# Patient Record
Sex: MALE | Race: WHITE | NOT HISPANIC OR LATINO
[De-identification: names, ages, dates, MRNs, and addresses within clinical notes are randomized per-mention and may not be internally consistent; named-entity substitution may affect disease eponyms.]

---

## 2020-01-27 DIAGNOSIS — Z86.79 PERSONAL HISTORY OF OTHER DISEASES OF THE CIRCULATORY SYSTEM: ICD-10-CM

## 2020-01-27 DIAGNOSIS — Z83.3 FAMILY HISTORY OF DIABETES MELLITUS: ICD-10-CM

## 2020-01-27 DIAGNOSIS — Z86.39 PERSONAL HISTORY OF OTHER ENDOCRINE, NUTRITIONAL AND METABOLIC DISEASE: ICD-10-CM

## 2020-01-27 DIAGNOSIS — Z80.41 FAMILY HISTORY OF MALIGNANT NEOPLASM OF OVARY: ICD-10-CM

## 2020-01-27 DIAGNOSIS — R73.03 PREDIABETES.: ICD-10-CM

## 2020-01-27 DIAGNOSIS — R42 DIZZINESS AND GIDDINESS: ICD-10-CM

## 2020-01-28 ENCOUNTER — NON-APPOINTMENT (OUTPATIENT)
Age: 45
End: 2020-01-28

## 2020-01-28 ENCOUNTER — APPOINTMENT (OUTPATIENT)
Dept: HEART AND VASCULAR | Facility: CLINIC | Age: 45
End: 2020-01-28
Payer: SELF-PAY

## 2020-01-28 VITALS
HEART RATE: 108 BPM | HEIGHT: 68 IN | RESPIRATION RATE: 16 BRPM | DIASTOLIC BLOOD PRESSURE: 98 MMHG | BODY MASS INDEX: 41.68 KG/M2 | SYSTOLIC BLOOD PRESSURE: 166 MMHG | WEIGHT: 275 LBS

## 2020-01-28 DIAGNOSIS — Z78.9 OTHER SPECIFIED HEALTH STATUS: ICD-10-CM

## 2020-01-28 PROCEDURE — 93000 ELECTROCARDIOGRAM COMPLETE: CPT

## 2020-01-28 PROCEDURE — 99204 OFFICE O/P NEW MOD 45 MIN: CPT

## 2020-01-28 RX ORDER — DILTIAZEM HYDROCHLORIDE 180 MG/1
180 CAPSULE, EXTENDED RELEASE ORAL
Refills: 0 | Status: DISCONTINUED | COMMUNITY
End: 2020-01-28

## 2020-01-28 RX ORDER — VITAMIN E ACID SUCCINATE 268 MG
TABLET ORAL
Refills: 0 | Status: ACTIVE | COMMUNITY

## 2020-01-28 RX ORDER — MULTIVITAMIN
TABLET ORAL
Refills: 0 | Status: ACTIVE | COMMUNITY

## 2020-01-28 RX ORDER — ASCORBIC ACID 500 MG
TABLET ORAL
Refills: 0 | Status: ACTIVE | COMMUNITY

## 2020-01-28 RX ORDER — ASPIRIN ENTERIC COATED TABLETS 81 MG 81 MG/1
81 TABLET, DELAYED RELEASE ORAL
Refills: 0 | Status: ACTIVE | COMMUNITY

## 2020-01-28 RX ORDER — ADHESIVE TAPE 3"X 2.3 YD
50 MCG TAPE, NON-MEDICATED TOPICAL
Refills: 0 | Status: ACTIVE | COMMUNITY

## 2020-01-28 NOTE — REASON FOR VISIT
[Initial Evaluation] : an initial evaluation of [FreeTextEntry1] : 44 year old M with history of WPW, ablation history in 2000, history of pericarditis in 2016, HTN, HL here with complaints of lightheadedness for 2 weeks, intermittent and on exertion. Fatigue on exertion. No chest pain, dyspnea, orthopnea, PND. Has been having LE edema over the past 2 months. Drinking water. \par \par EKG Sinus tachy with incomplete RBBB. \par \par Labs 01/2020: CMP WNL; A1C 7.0; Total chol 260; HDL 26; ; LDL (cant calculate).

## 2020-01-28 NOTE — DISCUSSION/SUMMARY
[FreeTextEntry1] : 44 year old M with history of WPW, ablation history in 2000, HTN, HL here with complaints of lightheadedness for 2 weeks, intermittent and on exertion. \par - d/c Diltiazem. Start Lisinopril 10 mg PO daily\par - Check stress echo\par - Check LE Doppler Venous\par - Holter 24 hours\par - EPS consult to be considered after testing\par - Followup after testing

## 2020-02-13 ENCOUNTER — APPOINTMENT (OUTPATIENT)
Dept: HEART AND VASCULAR | Facility: CLINIC | Age: 45
End: 2020-02-13
Payer: SELF-PAY

## 2020-02-13 VITALS
WEIGHT: 275 LBS | HEIGHT: 68 IN | HEART RATE: 100 BPM | BODY MASS INDEX: 41.68 KG/M2 | SYSTOLIC BLOOD PRESSURE: 124 MMHG | DIASTOLIC BLOOD PRESSURE: 70 MMHG

## 2020-02-13 PROCEDURE — 93325 DOPPLER ECHO COLOR FLOW MAPG: CPT

## 2020-02-13 PROCEDURE — 93351 STRESS TTE COMPLETE: CPT

## 2020-02-13 PROCEDURE — 93970 EXTREMITY STUDY: CPT

## 2020-02-13 PROCEDURE — 93320 DOPPLER ECHO COMPLETE: CPT

## 2020-02-13 RX ORDER — LISINOPRIL 10 MG/1
10 TABLET ORAL DAILY
Qty: 90 | Refills: 3 | Status: DISCONTINUED | COMMUNITY
Start: 2020-01-28 | End: 2020-02-13

## 2020-03-03 ENCOUNTER — APPOINTMENT (OUTPATIENT)
Dept: HEART AND VASCULAR | Facility: CLINIC | Age: 45
End: 2020-03-03
Payer: SELF-PAY

## 2020-03-03 VITALS
SYSTOLIC BLOOD PRESSURE: 100 MMHG | HEART RATE: 92 BPM | RESPIRATION RATE: 16 BRPM | HEIGHT: 68 IN | WEIGHT: 277 LBS | BODY MASS INDEX: 41.98 KG/M2 | DIASTOLIC BLOOD PRESSURE: 80 MMHG

## 2020-03-03 PROCEDURE — 99213 OFFICE O/P EST LOW 20 MIN: CPT

## 2020-03-03 NOTE — DISCUSSION/SUMMARY
[FreeTextEntry1] : 44 year old M with history of WPW, s/p ablation history in 2000, HTN, HL, Obesity. + fatigue. CVS stable. \par -  BP well controlled - c/w Lisinopril 20 mg PO daily\par -  Recommend sleep study. \par -  Diet/Exercise/Weight loss counseling\par -  Will return in 4 months

## 2020-03-03 NOTE — PHYSICAL EXAM
[Normal Appearance] : normal appearance [General Appearance - Well Developed] : well developed [Well Groomed] : well groomed [General Appearance - Well Nourished] : well nourished [No Deformities] : no deformities [General Appearance - In No Acute Distress] : no acute distress [No Oral Pallor] : no oral pallor [Normal Oral Mucosa] : normal oral mucosa [No Oral Cyanosis] : no oral cyanosis [Respiration, Rhythm And Depth] : normal respiratory rhythm and effort [Auscultation Breath Sounds / Voice Sounds] : lungs were clear to auscultation bilaterally [Exaggerated Use Of Accessory Muscles For Inspiration] : no accessory muscle use [Heart Rate And Rhythm] : heart rate and rhythm were normal [Murmurs] : no murmurs present [Heart Sounds] : normal S1 and S2 [Abdomen Soft] : soft [Abdomen Mass (___ Cm)] : no abdominal mass palpated [Abdomen Tenderness] : non-tender [Abnormal Walk] : normal gait [Gait - Sufficient For Exercise Testing] : the gait was sufficient for exercise testing [Nail Clubbing] : no clubbing of the fingernails [] : no ischemic changes [Petechial Hemorrhages (___cm)] : no petechial hemorrhages [Cyanosis, Localized] : no localized cyanosis [Oriented To Time, Place, And Person] : oriented to person, place, and time [No Anxiety] : not feeling anxious [Affect] : the affect was normal [Mood] : the mood was normal [FreeTextEntry1] : Edema - 2+ pitting

## 2020-03-03 NOTE — REASON FOR VISIT
[Initial Evaluation] : an initial evaluation of [FreeTextEntry1] : 44 year old M with history of WPW, ablation history in 2000, history of pericarditis in 2016, HTN, HL here for followup after recent testing. Still with fatigue on exertion. No chest pain, dyspnea, orthopnea, PND. Still has some LE edema. Drinking water. \par \par EXSE 02/13/2020: [facility] protocol 6 min - no EKG changes. No WMA. Accelerated HR. \par Resting echo: Normal LV function without significant valvular abnormalities. \par \par Holter 24 hour - SR with rare PVCs\par \par U/S LE Doppler - No DVT. no reflux\par \par EKG Sinus tachy with incomplete RBBB. \par \par Labs 01/2020: CMP WNL; A1C 7.0; Total chol 260; HDL 26; ; LDL (cant calculate).

## 2020-07-14 ENCOUNTER — APPOINTMENT (OUTPATIENT)
Dept: HEART AND VASCULAR | Facility: CLINIC | Age: 45
End: 2020-07-14
Payer: SELF-PAY

## 2020-07-14 VITALS
RESPIRATION RATE: 16 BRPM | HEART RATE: 120 BPM | BODY MASS INDEX: 40.62 KG/M2 | WEIGHT: 268 LBS | DIASTOLIC BLOOD PRESSURE: 80 MMHG | OXYGEN SATURATION: 94 % | TEMPERATURE: 98.7 F | SYSTOLIC BLOOD PRESSURE: 115 MMHG | HEIGHT: 68 IN

## 2020-07-14 PROCEDURE — 99214 OFFICE O/P EST MOD 30 MIN: CPT

## 2020-07-14 RX ORDER — LISINOPRIL 20 MG/1
20 TABLET ORAL DAILY
Qty: 90 | Refills: 3 | Status: DISCONTINUED | COMMUNITY
End: 2020-07-14

## 2020-07-14 NOTE — PHYSICAL EXAM
[General Appearance - Well Developed] : well developed [Well Groomed] : well groomed [Normal Appearance] : normal appearance [No Deformities] : no deformities [General Appearance - Well Nourished] : well nourished [General Appearance - In No Acute Distress] : no acute distress [Normal Oral Mucosa] : normal oral mucosa [No Oral Pallor] : no oral pallor [No Oral Cyanosis] : no oral cyanosis [Exaggerated Use Of Accessory Muscles For Inspiration] : no accessory muscle use [Auscultation Breath Sounds / Voice Sounds] : lungs were clear to auscultation bilaterally [Respiration, Rhythm And Depth] : normal respiratory rhythm and effort [Heart Rate And Rhythm] : heart rate and rhythm were normal [Heart Sounds] : normal S1 and S2 [Abdomen Soft] : soft [Murmurs] : no murmurs present [Abdomen Mass (___ Cm)] : no abdominal mass palpated [Abdomen Tenderness] : non-tender [Gait - Sufficient For Exercise Testing] : the gait was sufficient for exercise testing [Abnormal Walk] : normal gait [Nail Clubbing] : no clubbing of the fingernails [Petechial Hemorrhages (___cm)] : no petechial hemorrhages [Cyanosis, Localized] : no localized cyanosis [Oriented To Time, Place, And Person] : oriented to person, place, and time [] : no ischemic changes [Mood] : the mood was normal [Affect] : the affect was normal [No Anxiety] : not feeling anxious [FreeTextEntry1] : Edema - 2+ pitting

## 2020-07-14 NOTE — REASON FOR VISIT
[Initial Evaluation] : an initial evaluation of [FreeTextEntry1] : 45 year old M with history of WPW, ablation history in 2000, history of pericarditis in 2016, HTN, HL here for routine followup after recent testing. Still with fatigue on exertion. No chest pain, dyspnea, orthopnea, PND. LE improved. Now with markedly elevated TG as per PMD. Started on Fenofibrate.Stopped Lsinopril in March and still has had cough. Started Amlodipine 5 mg PO daily. States BP readings at home have been 140's SBP. Today in office BP WNL.\par \par EXSE 02/13/2020: [facility] protocol 6 min - no EKG changes. No WMA. Accelerated HR. \par Resting echo: Normal LV function without significant valvular abnormalities. \par \par Holter 24 hour - SR with rare PVCs\par \par U/S LE Doppler - No DVT. no reflux\par \par EKG Sinus tachy with incomplete RBBB. \par \par Labs 01/2020: CMP WNL; A1C 7.0; Total chol 260; HDL 26; ; LDL (cant calculate).

## 2020-07-14 NOTE — DISCUSSION/SUMMARY
[FreeTextEntry1] : 45 year old M with history of WPW, s/p ablation history in 2000, HTN, HL, Obesity. + fatigue. CVS stable. \par -  BP elevated - will ask patient to bring BP monitor to cross check - c/w Amlodipine 5 mg PO daily\par -  Recommend sleep study. \par - Recheck lipids in 608 weeks on new lipid mgmt regimen. \par -  Diet/Exercise/Weight loss counseling\par -  Will return in 6 months

## 2020-09-29 ENCOUNTER — APPOINTMENT (OUTPATIENT)
Dept: HEART AND VASCULAR | Facility: CLINIC | Age: 45
End: 2020-09-29
Payer: SELF-PAY

## 2020-09-29 VITALS
OXYGEN SATURATION: 95 % | BODY MASS INDEX: 40.06 KG/M2 | RESPIRATION RATE: 16 BRPM | DIASTOLIC BLOOD PRESSURE: 86 MMHG | SYSTOLIC BLOOD PRESSURE: 130 MMHG | TEMPERATURE: 97.8 F | HEART RATE: 104 BPM | WEIGHT: 264.31 LBS | HEIGHT: 68 IN

## 2020-09-29 PROCEDURE — 99214 OFFICE O/P EST MOD 30 MIN: CPT

## 2020-09-29 NOTE — REASON FOR VISIT
[Initial Evaluation] : an initial evaluation of [FreeTextEntry1] : 45 year old M with history of WPW, ablation history in 2000, history of pericarditis in 2016, HTN, HL here for routine followup  Still with fatigue on exertion and daytime somnolence. States he hasn’t done sleep study as yet due to cost. He does admit to being up all night though. No chest pain, dyspnea, orthopnea, PND. LE present today. Admits to drinking 12 glasses of 8 oz glass of water.  States BP readings at home have been 150's SBP. Today in office  mm Hg\par \par Note: Not on Lisinopril due to cough. \par \par EXSE 02/13/2020: [facility] protocol 6 min - no EKG changes. No WMA. Accelerated HR. \par Resting echo: Normal LV function without significant valvular abnormalities. \par \par Holter 24 hour - SR with rare PVCs\par \par U/S LE Doppler - No DVT. no reflux\par \par EKG Sinus tachy with incomplete RBBB. \par \par Labs 01/2020: CMP WNL; A1C 7.0; Total chol 260; HDL 26; ; LDL (cant calculate).

## 2020-09-29 NOTE — DISCUSSION/SUMMARY
[FreeTextEntry1] : 45 year old M with history of WPW, s/p ablation history in 2000, HTN, HL, Obesity. + fatigue. CVS stable. \par -  BP elevated - will ask patient to bring BP monitor to cross check - c/w Amlodipine 5 mg PO daily. To start Furosemide 20 mg PO daily x 5 days. \par -  Check CMP, lipid panel\par - c/w Fenofibrate. \par -  Diet/Exercise/Weight loss counseling\par -  Will return in 2 weeks.

## 2020-09-29 NOTE — PHYSICAL EXAM
[General Appearance - Well Developed] : well developed [Normal Appearance] : normal appearance [Well Groomed] : well groomed [General Appearance - Well Nourished] : well nourished [No Deformities] : no deformities [General Appearance - In No Acute Distress] : no acute distress [Normal Oral Mucosa] : normal oral mucosa [No Oral Pallor] : no oral pallor [No Oral Cyanosis] : no oral cyanosis [Respiration, Rhythm And Depth] : normal respiratory rhythm and effort [Exaggerated Use Of Accessory Muscles For Inspiration] : no accessory muscle use [Auscultation Breath Sounds / Voice Sounds] : lungs were clear to auscultation bilaterally [Heart Rate And Rhythm] : heart rate and rhythm were normal [Heart Sounds] : normal S1 and S2 [Murmurs] : no murmurs present [Abdomen Soft] : soft [Abdomen Tenderness] : non-tender [Abdomen Mass (___ Cm)] : no abdominal mass palpated [Abnormal Walk] : normal gait [Gait - Sufficient For Exercise Testing] : the gait was sufficient for exercise testing [Nail Clubbing] : no clubbing of the fingernails [Cyanosis, Localized] : no localized cyanosis [Petechial Hemorrhages (___cm)] : no petechial hemorrhages [] : no ischemic changes [Oriented To Time, Place, And Person] : oriented to person, place, and time [Affect] : the affect was normal [Mood] : the mood was normal [No Anxiety] : not feeling anxious [FreeTextEntry1] : Edema - 2+ pitting

## 2020-09-30 LAB
ALBUMIN SERPL ELPH-MCNC: 5.1 G/DL
ALP BLD-CCNC: 85 U/L
ALT SERPL-CCNC: 61 U/L
ANION GAP SERPL CALC-SCNC: 18 MMOL/L
AST SERPL-CCNC: 32 U/L
BASOPHILS # BLD AUTO: 0.05 K/UL
BASOPHILS NFR BLD AUTO: 0.5 %
BILIRUB SERPL-MCNC: 0.4 MG/DL
BUN SERPL-MCNC: 16 MG/DL
CALCIUM SERPL-MCNC: 10.3 MG/DL
CHLORIDE SERPL-SCNC: 98 MMOL/L
CHOLEST SERPL-MCNC: 242 MG/DL
CHOLEST/HDLC SERPL: 7.2 RATIO
CO2 SERPL-SCNC: 26 MMOL/L
CREAT SERPL-MCNC: 1.11 MG/DL
EOSINOPHIL # BLD AUTO: 0.25 K/UL
EOSINOPHIL NFR BLD AUTO: 2.6 %
GLUCOSE SERPL-MCNC: 157 MG/DL
HCT VFR BLD CALC: 53.6 %
HDLC SERPL-MCNC: 34 MG/DL
HGB BLD-MCNC: 17 G/DL
IMM GRANULOCYTES NFR BLD AUTO: 0.6 %
LDLC SERPL CALC-MCNC: 151 MG/DL
LYMPHOCYTES # BLD AUTO: 2.65 K/UL
LYMPHOCYTES NFR BLD AUTO: 27.9 %
MAN DIFF?: NORMAL
MCHC RBC-ENTMCNC: 30.6 PG
MCHC RBC-ENTMCNC: 31.7 GM/DL
MCV RBC AUTO: 96.4 FL
MONOCYTES # BLD AUTO: 0.59 K/UL
MONOCYTES NFR BLD AUTO: 6.2 %
NEUTROPHILS # BLD AUTO: 5.9 K/UL
NEUTROPHILS NFR BLD AUTO: 62.2 %
PLATELET # BLD AUTO: 222 K/UL
POTASSIUM SERPL-SCNC: 4.6 MMOL/L
PROT SERPL-MCNC: 7.2 G/DL
RBC # BLD: 5.56 M/UL
RBC # FLD: 14.2 %
SODIUM SERPL-SCNC: 141 MMOL/L
TRIGL SERPL-MCNC: 289 MG/DL
WBC # FLD AUTO: 9.5 K/UL

## 2020-10-27 ENCOUNTER — APPOINTMENT (OUTPATIENT)
Dept: HEART AND VASCULAR | Facility: CLINIC | Age: 45
End: 2020-10-27
Payer: SELF-PAY

## 2020-10-27 VITALS
TEMPERATURE: 97.3 F | BODY MASS INDEX: 40.54 KG/M2 | RESPIRATION RATE: 16 BRPM | WEIGHT: 267.5 LBS | SYSTOLIC BLOOD PRESSURE: 142 MMHG | DIASTOLIC BLOOD PRESSURE: 89 MMHG | HEIGHT: 68 IN | HEART RATE: 96 BPM | OXYGEN SATURATION: 95 %

## 2020-10-27 PROCEDURE — 99214 OFFICE O/P EST MOD 30 MIN: CPT

## 2020-10-27 NOTE — DISCUSSION/SUMMARY
[FreeTextEntry1] : 45 year old M with history of WPW, s/p ablation history in 2000, HTN, HL, Obesity. + fatigue. CVS stable. \par -  BP slihglty elevated - continue to keep BP log - c/w Amlodipine 5 mg PO daily. To increase Furosemide to 40 mg PO daily x 4 days. To call office with results. May need to increase if still no relief of edema. \par -  Check CMP, lipid panel at next visit. \par - c/w Fenofibrate. \par -  Diet/Exercise/Weight loss counseling\par -  Will return in 2 weeks.

## 2020-10-27 NOTE — PHYSICAL EXAM
[General Appearance - Well Developed] : well developed [Normal Appearance] : normal appearance [Well Groomed] : well groomed [General Appearance - Well Nourished] : well nourished [No Deformities] : no deformities [General Appearance - In No Acute Distress] : no acute distress [Normal Oral Mucosa] : normal oral mucosa [No Oral Pallor] : no oral pallor [No Oral Cyanosis] : no oral cyanosis [Respiration, Rhythm And Depth] : normal respiratory rhythm and effort [Exaggerated Use Of Accessory Muscles For Inspiration] : no accessory muscle use [Auscultation Breath Sounds / Voice Sounds] : lungs were clear to auscultation bilaterally [Heart Rate And Rhythm] : heart rate and rhythm were normal [Heart Sounds] : normal S1 and S2 [Murmurs] : no murmurs present [Abdomen Soft] : soft [Abdomen Tenderness] : non-tender [Abdomen Mass (___ Cm)] : no abdominal mass palpated [Abnormal Walk] : normal gait [Gait - Sufficient For Exercise Testing] : the gait was sufficient for exercise testing [Nail Clubbing] : no clubbing of the fingernails [Cyanosis, Localized] : no localized cyanosis [Petechial Hemorrhages (___cm)] : no petechial hemorrhages [] : no ischemic changes [FreeTextEntry1] : Edema - 2+ pitting [Oriented To Time, Place, And Person] : oriented to person, place, and time [Affect] : the affect was normal [Mood] : the mood was normal [No Anxiety] : not feeling anxious

## 2020-10-27 NOTE — REASON FOR VISIT
[Initial Evaluation] : an initial evaluation of [FreeTextEntry1] : 45 year old M with history of WPW, ablation history in 2000, history of pericarditis in 2016, HTN, HL here for 2 week followup  He states Lasix 20 mg PO daily did nothing. Had swelling over the weekend. Noticed when he changed from carbonated drinks to regular water, there was relief of swelling. States he has been trying to eat more vegetables, and not adding additional salt. However has some prepared foods with sodium. No chest pain, dyspnea. No orthopnea. BP at home 140's. \par \par Note: Not on Lisinopril due to cough. \par \par EXSE 02/13/2020: [facility] protocol 6 min - no EKG changes. No WMA. Accelerated HR. \par Resting echo: Normal LV function without significant valvular abnormalities. \par \par Holter 24 hour - SR with rare PVCs\par \par U/S LE Doppler - No DVT. no reflux\par \par EKG Sinus tachy with incomplete RBBB. \par \par Labs 01/2020: CMP WNL; A1C 7.0; Total chol 260; HDL 26; ; LDL (cant calculate).

## 2020-10-30 ENCOUNTER — NON-APPOINTMENT (OUTPATIENT)
Age: 45
End: 2020-10-30

## 2020-10-30 RX ORDER — FUROSEMIDE 20 MG/1
20 TABLET ORAL DAILY
Qty: 90 | Refills: 3 | Status: DISCONTINUED | COMMUNITY
Start: 2020-09-29 | End: 2020-10-30

## 2020-11-17 ENCOUNTER — APPOINTMENT (OUTPATIENT)
Dept: HEART AND VASCULAR | Facility: CLINIC | Age: 45
End: 2020-11-17
Payer: SELF-PAY

## 2020-11-17 VITALS
HEIGHT: 68 IN | HEART RATE: 105 BPM | DIASTOLIC BLOOD PRESSURE: 70 MMHG | SYSTOLIC BLOOD PRESSURE: 110 MMHG | TEMPERATURE: 97.7 F | OXYGEN SATURATION: 96 %

## 2020-11-17 PROCEDURE — 99214 OFFICE O/P EST MOD 30 MIN: CPT

## 2020-11-17 NOTE — DISCUSSION/SUMMARY
[FreeTextEntry1] : 45 year old M with history of WPW, s/p ablation history in 2000, HTN, HL, Obesity. + fatigue. CVS stable. Edema\par -  BP normal - continue to keep BP log - c/w Amlodipine 5 mg PO daily. \par - Edema - To increase Furosemide to 40 mg PO BID 3 x a week, and then 40 alternating days. \par -  Check CMP, lipid panel today. (fasting)\par -  c/w Fenofibrate. \par -  Diet/Exercise/Weight loss counseling\par -  Will return in 4 weeks.

## 2020-11-19 LAB
ALBUMIN SERPL ELPH-MCNC: 5 G/DL
ALP BLD-CCNC: 97 U/L
ALT SERPL-CCNC: 75 U/L
ANION GAP SERPL CALC-SCNC: 19 MMOL/L
AST SERPL-CCNC: 37 U/L
BASOPHILS # BLD AUTO: 0.05 K/UL
BASOPHILS NFR BLD AUTO: 0.5 %
BILIRUB SERPL-MCNC: 0.5 MG/DL
BUN SERPL-MCNC: 14 MG/DL
CALCIUM SERPL-MCNC: 9.3 MG/DL
CHLORIDE SERPL-SCNC: 97 MMOL/L
CHOLEST SERPL-MCNC: 221 MG/DL
CO2 SERPL-SCNC: 22 MMOL/L
CREAT SERPL-MCNC: 0.92 MG/DL
EOSINOPHIL # BLD AUTO: 0.2 K/UL
EOSINOPHIL NFR BLD AUTO: 2.1 %
ESTIMATED AVERAGE GLUCOSE: 160 MG/DL
GLUCOSE SERPL-MCNC: 143 MG/DL
HBA1C MFR BLD HPLC: 7.2 %
HCT VFR BLD CALC: 52.7 %
HDLC SERPL-MCNC: 31 MG/DL
HGB BLD-MCNC: 16.8 G/DL
IMM GRANULOCYTES NFR BLD AUTO: 0.7 %
LDLC SERPL CALC-MCNC: 113 MG/DL
LYMPHOCYTES # BLD AUTO: 2.37 K/UL
LYMPHOCYTES NFR BLD AUTO: 25.1 %
MAN DIFF?: NORMAL
MCHC RBC-ENTMCNC: 29.9 PG
MCHC RBC-ENTMCNC: 31.9 GM/DL
MCV RBC AUTO: 93.8 FL
MONOCYTES # BLD AUTO: 0.69 K/UL
MONOCYTES NFR BLD AUTO: 7.3 %
NEUTROPHILS # BLD AUTO: 6.08 K/UL
NEUTROPHILS NFR BLD AUTO: 64.3 %
NONHDLC SERPL-MCNC: 191 MG/DL
PLATELET # BLD AUTO: 211 K/UL
POTASSIUM SERPL-SCNC: 3.6 MMOL/L
PROT SERPL-MCNC: 7.3 G/DL
RBC # BLD: 5.62 M/UL
RBC # FLD: 14 %
SODIUM SERPL-SCNC: 138 MMOL/L
TRIGL SERPL-MCNC: 392 MG/DL
WBC # FLD AUTO: 9.46 K/UL

## 2020-11-20 ENCOUNTER — NON-APPOINTMENT (OUTPATIENT)
Age: 45
End: 2020-11-20

## 2020-12-15 ENCOUNTER — APPOINTMENT (OUTPATIENT)
Dept: HEART AND VASCULAR | Facility: CLINIC | Age: 45
End: 2020-12-15
Payer: SELF-PAY

## 2020-12-15 VITALS
HEIGHT: 68 IN | DIASTOLIC BLOOD PRESSURE: 80 MMHG | TEMPERATURE: 97.3 F | BODY MASS INDEX: 41.83 KG/M2 | SYSTOLIC BLOOD PRESSURE: 124 MMHG | WEIGHT: 276 LBS | OXYGEN SATURATION: 96 % | RESPIRATION RATE: 16 BRPM | HEART RATE: 96 BPM

## 2020-12-15 PROCEDURE — 99213 OFFICE O/P EST LOW 20 MIN: CPT

## 2020-12-15 RX ORDER — METFORMIN HYDROCHLORIDE 500 MG/1
500 TABLET, COATED ORAL
Qty: 180 | Refills: 0 | Status: DISCONTINUED | COMMUNITY
Start: 2020-07-22

## 2020-12-15 RX ORDER — FENOFIBRATE 134 MG/1
134 CAPSULE ORAL
Refills: 0 | Status: DISCONTINUED | COMMUNITY
End: 2020-12-15

## 2020-12-15 RX ORDER — AZELASTINE HYDROCHLORIDE 137 UG/1
0.1 SPRAY, METERED NASAL
Qty: 30 | Refills: 0 | Status: DISCONTINUED | COMMUNITY
Start: 2020-07-14

## 2020-12-15 RX ORDER — FENOFIBRATE 200 MG/1
200 CAPSULE ORAL
Qty: 90 | Refills: 0 | Status: ACTIVE | COMMUNITY
Start: 2020-08-12

## 2020-12-16 NOTE — DISCUSSION/SUMMARY
[FreeTextEntry1] : 45 year old M with history of WPW, s/p ablation history in 2000, HTN, HL, Obesity. + fatigue. CVS stable. Edema\par -  BP normal - continue to keep BP log - c/w Amlodipine 5 mg PO daily. \par - Edema - Asked patient to increase Lasix to BID x 1 week, and then revert back to regimen of Furosemide to 40 mg PO BID 3 x a week, and then 40 alternating days. \par -  c/w Fenofibrate and Rosuvastatin 10 mg PO daily. \par -  Diet/Exercise/Weight loss counseling\par -  Will return in 3 months. Any issues, patient to call me sooner. \par - To see Dr. Wadsworth in January.

## 2020-12-16 NOTE — REASON FOR VISIT
[Initial Evaluation] : an initial evaluation of [FreeTextEntry1] : 45 year old M with history of WPW, ablation history in 2000, history of pericarditis in 2016, HTN, HL here for 2 week followup  He feels current Lasix regimen (Lasix 40 BID M/W/F and 40 mg PO daily alternating days) has helped some, however still has some LE edema.  Labs reviewed with him. TG elevated. Suggested to see Dr. Wadsworth, as his brother too sees her, however will be getting insurance in January and will wait till then. \par \par Note: Not on Lisinopril due to cough. \par \par EXSE 02/13/2020: [facility] protocol 6 min - no EKG changes. No WMA. Accelerated HR. \par Resting echo: Normal LV function without significant valvular abnormalities. \par \par Holter 24 hour - SR with rare PVCs\par \par U/S LE Doppler - No DVT. no reflux\par \par EKG Sinus tachy with incomplete RBBB. \par \par Labs 01/2020: CMP WNL; A1C 7.0; Total chol 260; HDL 26; ; LDL (cant calculate).

## 2020-12-18 ENCOUNTER — APPOINTMENT (OUTPATIENT)
Dept: HEART AND VASCULAR | Facility: CLINIC | Age: 45
End: 2020-12-18
Payer: SELF-PAY

## 2020-12-18 PROCEDURE — 93971 EXTREMITY STUDY: CPT

## 2021-03-16 ENCOUNTER — APPOINTMENT (OUTPATIENT)
Dept: HEART AND VASCULAR | Facility: CLINIC | Age: 46
End: 2021-03-16
Payer: COMMERCIAL

## 2021-03-16 VITALS
OXYGEN SATURATION: 95 % | DIASTOLIC BLOOD PRESSURE: 88 MMHG | BODY MASS INDEX: 42.27 KG/M2 | HEART RATE: 94 BPM | RESPIRATION RATE: 16 BRPM | SYSTOLIC BLOOD PRESSURE: 138 MMHG | TEMPERATURE: 97.6 F | WEIGHT: 278 LBS

## 2021-03-16 PROCEDURE — 99072 ADDL SUPL MATRL&STAF TM PHE: CPT

## 2021-03-16 PROCEDURE — 99214 OFFICE O/P EST MOD 30 MIN: CPT

## 2021-03-16 NOTE — REASON FOR VISIT
[Initial Evaluation] : an initial evaluation of [FreeTextEntry1] : 45 year old M with history of WPW, ablation history in 2000, history of pericarditis in 2016, HTN, HL here for followup. His edema has improved. He remains on Lasix 40 mg Po daily. He has not seen Dr. Wadsworth as yet. Has gotten insurance, and will schedule appointment to see her. \par \par Note: Not on Lisinopril due to cough. \par \par EXSE 02/13/2020: [facility] protocol 6 min - no EKG changes. No WMA. Accelerated HR. \par Resting echo: Normal LV function without significant valvular abnormalities. \par \par Holter 24 hour - SR with rare PVCs\par \par U/S LE Doppler - No DVT. no reflux\par \par EKG Sinus tachy with incomplete RBBB. \par Labs 11/18/2020: Glc 143; ALT 75; AST 37; Cr normal; ; Total chol 221; HDL 31; ; A1C 7.2\par Labs 01/2020: CMP WNL; A1C 7.0; Total chol 260; HDL 26; ; LDL (cant calculate).

## 2021-03-16 NOTE — DISCUSSION/SUMMARY
[FreeTextEntry1] : 45 year old M with history of WPW, s/p ablation history in 2000, HTN, HL, Hypertriglyceridemia, Obesity. + fatigue. CVS stable. \par -  BP normal - continue to keep BP log - c/w Amlodipine 5 mg PO daily. \par - Edema improved -  c/w Lasix 40 mg Po daily\par -  c/w Fenofibrate and Rosuvastatin 10 mg PO daily. \par -  To schedule appointment with Dr. Wadsworth. \par -  Diet/Exercise/Weight loss counseling\par -  Will return in 6 months. Any issues, patient to call me sooner. \par

## 2021-03-17 LAB
ALBUMIN SERPL ELPH-MCNC: 4.7 G/DL
ALP BLD-CCNC: 120 U/L
ALT SERPL-CCNC: 62 U/L
ANION GAP SERPL CALC-SCNC: 10 MMOL/L
AST SERPL-CCNC: 28 U/L
BASOPHILS # BLD AUTO: 0.05 K/UL
BASOPHILS NFR BLD AUTO: 0.6 %
BILIRUB SERPL-MCNC: 0.3 MG/DL
BUN SERPL-MCNC: 10 MG/DL
CALCIUM SERPL-MCNC: 9.1 MG/DL
CHLORIDE SERPL-SCNC: 99 MMOL/L
CO2 SERPL-SCNC: 27 MMOL/L
CREAT SERPL-MCNC: 0.76 MG/DL
EOSINOPHIL # BLD AUTO: 0.22 K/UL
EOSINOPHIL NFR BLD AUTO: 2.5 %
GLUCOSE SERPL-MCNC: 214 MG/DL
HCT VFR BLD CALC: 53.7 %
HGB BLD-MCNC: 16.9 G/DL
IMM GRANULOCYTES NFR BLD AUTO: 1.5 %
LYMPHOCYTES # BLD AUTO: 2.7 K/UL
LYMPHOCYTES NFR BLD AUTO: 30.2 %
MAN DIFF?: NORMAL
MCHC RBC-ENTMCNC: 29.8 PG
MCHC RBC-ENTMCNC: 31.5 GM/DL
MCV RBC AUTO: 94.5 FL
MONOCYTES # BLD AUTO: 0.62 K/UL
MONOCYTES NFR BLD AUTO: 6.9 %
NEUTROPHILS # BLD AUTO: 5.21 K/UL
NEUTROPHILS NFR BLD AUTO: 58.3 %
PLATELET # BLD AUTO: 209 K/UL
POTASSIUM SERPL-SCNC: 4.5 MMOL/L
PROT SERPL-MCNC: 6.8 G/DL
RBC # BLD: 5.68 M/UL
RBC # FLD: 14.4 %
SODIUM SERPL-SCNC: 136 MMOL/L
WBC # FLD AUTO: 8.93 K/UL

## 2021-03-18 ENCOUNTER — NON-APPOINTMENT (OUTPATIENT)
Age: 46
End: 2021-03-18

## 2021-04-20 ENCOUNTER — APPOINTMENT (OUTPATIENT)
Dept: HEART AND VASCULAR | Facility: CLINIC | Age: 46
End: 2021-04-20
Payer: COMMERCIAL

## 2021-04-20 DIAGNOSIS — R74.8 ABNORMAL LEVELS OF OTHER SERUM ENZYMES: ICD-10-CM

## 2021-04-20 PROCEDURE — 99214 OFFICE O/P EST MOD 30 MIN: CPT | Mod: 95

## 2021-04-20 RX ORDER — SULFAMETHOXAZOLE AND TRIMETHOPRIM 800; 160 MG/1; MG/1
800-160 TABLET ORAL
Qty: 21 | Refills: 0 | Status: DISCONTINUED | COMMUNITY
Start: 2021-03-20

## 2021-04-20 NOTE — ASSESSMENT
[FreeTextEntry1] : 45 year old M with history of WPW, ablation history in 2000, history of pericarditis in 2016, HTN, HL, metabolic syndrome here for preventive cardiology assessment.  \par \par He will visit our [employer] website to learn more about Glp-1's. \par \par He will have labs drawn in [place] and is interested in both nutrition guidance and potentially Glp-1 for improving DM, metabolic syndrome and weight. \par \par Referred to Radha Iniguez for dietary guidance. \par We will talk after his current lab work and he will likely start on a Glp-1 at that time. \par Encouraged him to continue his current exercise regimen with his brother. \par He will continue to check BP's at home as well.

## 2021-04-20 NOTE — HISTORY OF PRESENT ILLNESS
[Home] : at home, [unfilled] , at the time of the visit. [Medical Office: ([facility])___] : at the medical office located in  [Verbal consent obtained from patient] : the patient, [unfilled] [FreeTextEntry1] : This visit was conducted using a telehealth platform in the setting of COVID-19 Pandemic.\par Patient initiated current encounter. Patient has provided verbal authorization to participate in this visit. \par Reason for telehealth visit: \par I have spent 30 minutes with the patient face-to-face discussing.\par Documentation- 5 minutes\par Physical exam was not done. \par \par 45 year old M with history of WPW, ablation history in 2000, history of pericarditis in 2016, HTN, HL, metabolic syndrome.\par \par He reports that his blood pressures at home on a whole are normal once weekly. \par \par He and his brother have been eating healthy and exercising regularly on machines at home. \par \par He reports that his issue is mostly his prediabetes and his blood pressures are improved as are his lower extremity edema. \par \par He reports having prediabetes for about 1 year. \par \par He lost 4 pounds. He is at 272 lbs. He feels that his weight is very variable. He also has gained a lot of weight in his abdomen. \par \par Sleeping is an issue for him. He is not having trouble with breathing. He will sometimes get up and cough at night and he feels that he may have esophagitis. \par \par He gets up coughing with his throat. He notes that it happens from 2-4 am. Dinner at 10 PM. \par He will usually get up at 8-9 AM based on his brother's schedule.\par \par He has not gotten a hepatic US for himself and he has not had a colonoscopy. \par \par Lipids-\par chol-221, HDL 31, , HDL- 31, non-HDL- 191. \par HbA1C 7.2\par \par \par

## 2021-04-28 ENCOUNTER — APPOINTMENT (OUTPATIENT)
Dept: HEART AND VASCULAR | Facility: CLINIC | Age: 46
End: 2021-04-28
Payer: COMMERCIAL

## 2021-04-28 ENCOUNTER — NON-APPOINTMENT (OUTPATIENT)
Age: 46
End: 2021-04-28

## 2021-04-28 PROCEDURE — 36415 COLL VENOUS BLD VENIPUNCTURE: CPT

## 2021-04-28 PROCEDURE — 99072 ADDL SUPL MATRL&STAF TM PHE: CPT

## 2021-04-30 ENCOUNTER — NON-APPOINTMENT (OUTPATIENT)
Age: 46
End: 2021-04-30

## 2021-04-30 LAB
ALBUMIN SERPL ELPH-MCNC: 4.5 G/DL
ALP BLD-CCNC: 104 U/L
ALT SERPL-CCNC: 76 U/L
ANION GAP SERPL CALC-SCNC: 14 MMOL/L
AST SERPL-CCNC: 39 U/L
BASOPHILS # BLD AUTO: 0.05 K/UL
BASOPHILS NFR BLD AUTO: 0.5 %
BILIRUB SERPL-MCNC: 0.5 MG/DL
BUN SERPL-MCNC: 17 MG/DL
CALCIUM SERPL-MCNC: 10.1 MG/DL
CHLORIDE SERPL-SCNC: 97 MMOL/L
CHOLEST SERPL-MCNC: 257 MG/DL
CO2 SERPL-SCNC: 26 MMOL/L
CREAT SERPL-MCNC: 0.93 MG/DL
EOSINOPHIL # BLD AUTO: 0.16 K/UL
EOSINOPHIL NFR BLD AUTO: 1.7 %
GLUCOSE SERPL-MCNC: 194 MG/DL
HCT VFR BLD CALC: 55.8 %
HDLC SERPL-MCNC: 29 MG/DL
HGB BLD-MCNC: 17 G/DL
IMM GRANULOCYTES NFR BLD AUTO: 0.6 %
LDLC SERPL CALC-MCNC: NORMAL MG/DL
LYMPHOCYTES # BLD AUTO: 3.03 K/UL
LYMPHOCYTES NFR BLD AUTO: 32.6 %
MAN DIFF?: NORMAL
MCHC RBC-ENTMCNC: 29.2 PG
MCHC RBC-ENTMCNC: 30.5 GM/DL
MCV RBC AUTO: 95.9 FL
MONOCYTES # BLD AUTO: 0.65 K/UL
MONOCYTES NFR BLD AUTO: 7 %
NEUTROPHILS # BLD AUTO: 5.35 K/UL
NEUTROPHILS NFR BLD AUTO: 57.6 %
NONHDLC SERPL-MCNC: 228 MG/DL
PLATELET # BLD AUTO: 190 K/UL
POTASSIUM SERPL-SCNC: 4.3 MMOL/L
PROT SERPL-MCNC: 7.2 G/DL
RBC # BLD: 5.82 M/UL
RBC # FLD: 14.9 %
SODIUM SERPL-SCNC: 138 MMOL/L
TRIGL SERPL-MCNC: 523 MG/DL
WBC # FLD AUTO: 9.3 K/UL

## 2021-06-02 ENCOUNTER — APPOINTMENT (OUTPATIENT)
Dept: CARDIOLOGY | Facility: CLINIC | Age: 46
End: 2021-06-02
Payer: COMMERCIAL

## 2021-06-02 PROCEDURE — 97802 MEDICAL NUTRITION INDIV IN: CPT | Mod: 95

## 2021-09-15 ENCOUNTER — APPOINTMENT (OUTPATIENT)
Dept: HEART AND VASCULAR | Facility: CLINIC | Age: 46
End: 2021-09-15
Payer: COMMERCIAL

## 2021-09-15 PROCEDURE — 36415 COLL VENOUS BLD VENIPUNCTURE: CPT

## 2021-09-17 LAB
ALBUMIN SERPL ELPH-MCNC: 4.8 G/DL
ALP BLD-CCNC: 101 U/L
ALT SERPL-CCNC: 83 U/L
ANION GAP SERPL CALC-SCNC: 13 MMOL/L
AST SERPL-CCNC: 37 U/L
BASOPHILS # BLD AUTO: 0.05 K/UL
BASOPHILS NFR BLD AUTO: 0.6 %
BILIRUB SERPL-MCNC: 0.5 MG/DL
BUN SERPL-MCNC: 16 MG/DL
CALCIUM SERPL-MCNC: 9.7 MG/DL
CHLORIDE SERPL-SCNC: 102 MMOL/L
CHOLEST SERPL-MCNC: 264 MG/DL
CO2 SERPL-SCNC: 26 MMOL/L
CREAT SERPL-MCNC: 1.04 MG/DL
EOSINOPHIL # BLD AUTO: 0.2 K/UL
EOSINOPHIL NFR BLD AUTO: 2.3 %
ESTIMATED AVERAGE GLUCOSE: 169 MG/DL
GLUCOSE SERPL-MCNC: 187 MG/DL
HBA1C MFR BLD HPLC: 7.5 %
HCT VFR BLD CALC: 56.4 %
HDLC SERPL-MCNC: 31 MG/DL
HGB BLD-MCNC: 17.1 G/DL
IMM GRANULOCYTES NFR BLD AUTO: 0.7 %
LDLC SERPL CALC-MCNC: NORMAL MG/DL
LYMPHOCYTES # BLD AUTO: 2.88 K/UL
LYMPHOCYTES NFR BLD AUTO: 32.7 %
MAN DIFF?: NORMAL
MCHC RBC-ENTMCNC: 30.2 PG
MCHC RBC-ENTMCNC: 30.3 GM/DL
MCV RBC AUTO: 99.6 FL
MONOCYTES # BLD AUTO: 0.65 K/UL
MONOCYTES NFR BLD AUTO: 7.4 %
NEUTROPHILS # BLD AUTO: 4.97 K/UL
NEUTROPHILS NFR BLD AUTO: 56.3 %
NONHDLC SERPL-MCNC: 233 MG/DL
PLATELET # BLD AUTO: 225 K/UL
POTASSIUM SERPL-SCNC: 5.1 MMOL/L
PROT SERPL-MCNC: 7.1 G/DL
RBC # BLD: 5.66 M/UL
RBC # FLD: 14.2 %
SODIUM SERPL-SCNC: 141 MMOL/L
TRIGL SERPL-MCNC: 615 MG/DL
WBC # FLD AUTO: 8.81 K/UL

## 2021-09-20 ENCOUNTER — APPOINTMENT (OUTPATIENT)
Dept: GASTROENTEROLOGY | Facility: CLINIC | Age: 46
End: 2021-09-20
Payer: COMMERCIAL

## 2021-09-20 VITALS
TEMPERATURE: 98.1 F | BODY MASS INDEX: 40.29 KG/M2 | DIASTOLIC BLOOD PRESSURE: 90 MMHG | OXYGEN SATURATION: 97 % | HEIGHT: 69 IN | HEART RATE: 99 BPM | SYSTOLIC BLOOD PRESSURE: 130 MMHG | WEIGHT: 272 LBS

## 2021-09-20 PROCEDURE — 99204 OFFICE O/P NEW MOD 45 MIN: CPT

## 2021-09-20 NOTE — ASSESSMENT
[FreeTextEntry1] : Will plan on an upper endoscopy for GERD, multiple risk factors for Can's esophagus (male, , obesity) in the setting of reflux symptoms.  Explained risks/benefits/alternatives including not limited to bleeding, infection, perforation, missed lesions, anesthesia complications.  Patient understands and agrees, all questions answered.\par \par Will plan on a colonoscopy for screening.  Explained risks/benefits/alternatives including not limited to bleeding, infection, perforation, missed lesions, anesthesia complications.  Patient understands and agrees, all questions answered.  Will use a split dose miralax/gatorade prep with clears the day prior.

## 2021-09-20 NOTE — HISTORY OF PRESENT ILLNESS
[FreeTextEntry1] : Mr. Lopes is a pleasant 46M h/o WPW s/p ablation 2000, paricarditis 2016, HTN, HLD, metabolic syndrome who presents to establish care.\par \par Feels well other than c/o frequent heartburn over the past month or so, severe at times. Has had significant, burning pain in his lower chest which has been relieved after eating. No N/V, dysphagia, odynophagia, early satiety.  Started after staring Ozempic.\par \par Normal brown BM daily, no blood or black stool. Has never had any form of colon cancer screening.\par \par Does not smoke or drink.\par \par No FHx of any GI malignancies.

## 2021-09-20 NOTE — PHYSICAL EXAM
[General Appearance - In No Acute Distress] : in no acute distress [General Appearance - Alert] : alert [Sclera] : the sclera and conjunctiva were normal [Outer Ear] : the ears and nose were normal in appearance [Neck Appearance] : the appearance of the neck was normal [] : no respiratory distress [Apical Impulse] : the apical impulse was normal [Abdomen Soft] : soft [Abdomen Tenderness] : non-tender [Abnormal Walk] : normal gait [Skin Color & Pigmentation] : normal skin color and pigmentation [Cranial Nerves] : cranial nerves 2-12 were intact [Oriented To Time, Place, And Person] : oriented to person, place, and time

## 2021-09-21 ENCOUNTER — APPOINTMENT (OUTPATIENT)
Dept: HEART AND VASCULAR | Facility: CLINIC | Age: 46
End: 2021-09-21
Payer: COMMERCIAL

## 2021-09-21 VITALS
DIASTOLIC BLOOD PRESSURE: 74 MMHG | OXYGEN SATURATION: 95 % | TEMPERATURE: 97.6 F | SYSTOLIC BLOOD PRESSURE: 110 MMHG | WEIGHT: 272 LBS | RESPIRATION RATE: 16 BRPM | HEART RATE: 98 BPM | HEIGHT: 69 IN | BODY MASS INDEX: 40.29 KG/M2

## 2021-09-21 DIAGNOSIS — I70.219 ATHEROSCLEROSIS OF NATIVE ARTERIES OF EXTREMITIES WITH INTERMITTENT CLAUDICATION, UNSPECIFIED EXTREMITY: ICD-10-CM

## 2021-09-21 PROCEDURE — 99214 OFFICE O/P EST MOD 30 MIN: CPT

## 2021-09-21 NOTE — DISCUSSION/SUMMARY
[FreeTextEntry1] : 46 year old M with history of WPW, s/p ablation history in 2000, HTN, HL, Hypertriglyceridemia, Obesity. CVS stable. \par -  BP normal - continue to keep BP log - c/w Amlodipine 5 mg PO daily. \par - Edema improved -  c/w Lasix 40 mg Po daily\par -  c/w Fenofibrate and start Rosuvastatin 10 mg PO daily. Start Vascepa (given markedly elevated TG)\par -  To schedule appointment with Dr. Wadsworth. Check labs in 6-8 weeks\par -  Diet/Exercise/Weight loss counseling\par -  Check Carotid Arteries and DLA\par -  Will return in 3 months. Any issues, patient to call me sooner. \par

## 2021-09-21 NOTE — PHYSICAL EXAM
[General Appearance - Well Developed] : well developed [Normal Appearance] : normal appearance [Well Groomed] : well groomed [General Appearance - Well Nourished] : well nourished [No Deformities] : no deformities [Normal Oral Mucosa] : normal oral mucosa [General Appearance - In No Acute Distress] : no acute distress [No Oral Pallor] : no oral pallor [No Oral Cyanosis] : no oral cyanosis [Respiration, Rhythm And Depth] : normal respiratory rhythm and effort [Auscultation Breath Sounds / Voice Sounds] : lungs were clear to auscultation bilaterally [Exaggerated Use Of Accessory Muscles For Inspiration] : no accessory muscle use [Heart Rate And Rhythm] : heart rate and rhythm were normal [Heart Sounds] : normal S1 and S2 [Murmurs] : no murmurs present [Abdomen Tenderness] : non-tender [Abdomen Soft] : soft [Abdomen Mass (___ Cm)] : no abdominal mass palpated [Abnormal Walk] : normal gait [Gait - Sufficient For Exercise Testing] : the gait was sufficient for exercise testing [Nail Clubbing] : no clubbing of the fingernails [Cyanosis, Localized] : no localized cyanosis [Petechial Hemorrhages (___cm)] : no petechial hemorrhages [] : no ischemic changes [Oriented To Time, Place, And Person] : oriented to person, place, and time [Affect] : the affect was normal [No Anxiety] : not feeling anxious [Mood] : the mood was normal [FreeTextEntry1] : Edema - 2+ pitting

## 2021-09-21 NOTE — REASON FOR VISIT
[Follow-Up - Clinic] : a clinic follow-up of [FreeTextEntry1] : 46 year old M with history of WPW, ablation history in 2000, history of pericarditis in 2016, HTN, HL here for followup. His edema has improved. He remains on Lasix 40 mg Po daily. He saw Dr. Wadsworth in a teleheatlh visit and subsequently had a visit with Radha Iniguez (dietician). He has not followed up as yet with them. He is on Ozempic. He started having heartburn sensation after starting and saw GI yesterday. as yet. Has gotten insurance, and will schedule appointment to see her. Legs burning when walking. Per  notes, was on Rosuvastatin however patient denies being on it. \par \par Labs 09/17/2021: ALT 83; Glucose 187; A1C 7.5; ; total chol 264; HDL 31; LDL not calc; CBC WNL \par Labs 04/30/2021: ; total chol 257; HDL 29; LDL not calc; AST 74; Glc 194; otherwise normal;CBC WNL; \par \par Note: Not on Lisinopril due to cough. \par \par EXSE 02/13/2020: [facility] protocol 6 min - no EKG changes. No WMA. Accelerated HR. \par Resting echo: Normal LV function without significant valvular abnormalities. \par \par Holter 24 hour - SR with rare PVCs\par \par U/S LE Doppler - No DVT. no reflux\par \par EKG Sinus tachy with incomplete RBBB. \par Labs 11/18/2020: Glc 143; ALT 75; AST 37; Cr normal; ; Total chol 221; HDL 31; ; A1C 7.2\par Labs 01/2020: CMP WNL; A1C 7.0; Total chol 260; HDL 26; ; LDL (cant calculate).

## 2021-11-05 ENCOUNTER — NON-APPOINTMENT (OUTPATIENT)
Age: 46
End: 2021-11-05

## 2021-11-05 ENCOUNTER — APPOINTMENT (OUTPATIENT)
Dept: HEART AND VASCULAR | Facility: CLINIC | Age: 46
End: 2021-11-05
Payer: COMMERCIAL

## 2021-11-05 VITALS
OXYGEN SATURATION: 96 % | SYSTOLIC BLOOD PRESSURE: 116 MMHG | HEART RATE: 116 BPM | HEIGHT: 68 IN | WEIGHT: 265 LBS | DIASTOLIC BLOOD PRESSURE: 76 MMHG | BODY MASS INDEX: 40.16 KG/M2

## 2021-11-05 PROCEDURE — 36415 COLL VENOUS BLD VENIPUNCTURE: CPT

## 2021-11-05 PROCEDURE — 93000 ELECTROCARDIOGRAM COMPLETE: CPT

## 2021-11-05 PROCEDURE — 99215 OFFICE O/P EST HI 40 MIN: CPT

## 2021-11-05 NOTE — HISTORY OF PRESENT ILLNESS
[FreeTextEntry1] : 46 year old M with history of WPW, ablation history in 2000, history of pericarditis in 2016, HTN, HLD, metabolic syndrome.\par \par He is feeling well. No complaints. \par \par Has been on ozempic since mid august. He is on 0.5mg. Has been tolerating ozempic - did have heart burn for 1 week.He does report he is less hungry and only likes to eat vegetables now. He is losing weight. \par \par Has been more active and exercising more recently. \par Peripheral edema from LE has resolved. \par He continues to report waking choking and acid reflux. \par \par Lipids-\par Chol 15 Sep 2021: , HDL 31mg, Non . A1c - 7.5%\par chol-221, HDL 31, , HDL- 31, non-HDL- 191. \par HbA1C 7.2\par \par \par

## 2021-11-05 NOTE — DISCUSSION/SUMMARY
[FreeTextEntry1] : 46 year old M with history of WPW, ablation history in 2000, history of pericarditis in 2016, HTN, mixed dyslipidemia, metabolic syndrome here for preventive cardiology assessment.  \par \par #Mixed dyslipidemia\par #Severe hypertriglyceridemia - likely polygenic disorder\par #Hyperlipidemia\par -Markedly suboptimal non HDL\par -increase rosuvastatin from 10mg to 20mg\par -c/w fenofibrate 200mg, vascepa which are at optimal doses\par -Re-emphasized dietary change which he reprots he has made. Advised to follow with Radha Iniguez again,.\par The rosuvastatin increase may assist with TG control more.\par Holding off on niacin for now - potential for interaction with multiple LLT. \par \par #Obesity\par #Metabolic syndrome\par #DM2\par -Increase ozempic to 1mg\par -We will transition to wygovy semaglutide if higher doses needed to achieve weight loss goal. Next step would be 1.7mg and 2.4mg\par -Check TSH\par \par  #HTN: well controlled, continue amlodipine\par \par Follow up labs in [place] in 2 months - Tele-health visit at that time.

## 2021-11-07 LAB — TSH SERPL-ACNC: 2.96 UIU/ML

## 2021-11-12 ENCOUNTER — APPOINTMENT (OUTPATIENT)
Dept: HEART AND VASCULAR | Facility: CLINIC | Age: 46
End: 2021-11-12
Payer: COMMERCIAL

## 2021-11-12 PROCEDURE — 93880 EXTRACRANIAL BILAT STUDY: CPT

## 2021-11-12 PROCEDURE — 93925 LOWER EXTREMITY STUDY: CPT

## 2021-11-15 ENCOUNTER — RESULT REVIEW (OUTPATIENT)
Age: 46
End: 2021-11-15

## 2021-11-16 ENCOUNTER — RESULT REVIEW (OUTPATIENT)
Age: 46
End: 2021-11-16

## 2021-11-17 ENCOUNTER — NON-APPOINTMENT (OUTPATIENT)
Age: 46
End: 2021-11-17

## 2021-11-17 ENCOUNTER — APPOINTMENT (OUTPATIENT)
Dept: GASTROENTEROLOGY | Facility: HOSPITAL | Age: 46
End: 2021-11-17

## 2021-11-22 DIAGNOSIS — A04.8 OTHER SPECIFIED BACTERIAL INTESTINAL INFECTIONS: ICD-10-CM

## 2021-12-06 NOTE — PHYSICAL EXAM
[General Appearance - Well Developed] : well developed [Normal Appearance] : normal appearance [Well Groomed] : well groomed [General Appearance - Well Nourished] : well nourished [No Deformities] : no deformities [General Appearance - In No Acute Distress] : no acute distress [Normal Oral Mucosa] : normal oral mucosa [No Oral Pallor] : no oral pallor [No Oral Cyanosis] : no oral cyanosis [Heart Rate And Rhythm] : heart rate and rhythm were normal [Heart Sounds] : normal S1 and S2 [Murmurs] : no murmurs present [Respiration, Rhythm And Depth] : normal respiratory rhythm and effort [Exaggerated Use Of Accessory Muscles For Inspiration] : no accessory muscle use [Auscultation Breath Sounds / Voice Sounds] : lungs were clear to auscultation bilaterally [Abdomen Tenderness] : non-tender [Abdomen Soft] : soft [Abdomen Mass (___ Cm)] : no abdominal mass palpated [Abnormal Walk] : normal gait [Gait - Sufficient For Exercise Testing] : the gait was sufficient for exercise testing [Nail Clubbing] : no clubbing of the fingernails [Cyanosis, Localized] : no localized cyanosis [Petechial Hemorrhages (___cm)] : no petechial hemorrhages [] : no ischemic changes [Oriented To Time, Place, And Person] : oriented to person, place, and time [Affect] : the affect was normal [Mood] : the mood was normal [No Anxiety] : not feeling anxious [FreeTextEntry1] : Edema - 2+ pitting no concerns

## 2021-12-14 ENCOUNTER — APPOINTMENT (OUTPATIENT)
Dept: HEART AND VASCULAR | Facility: CLINIC | Age: 46
End: 2021-12-14
Payer: COMMERCIAL

## 2021-12-14 VITALS
TEMPERATURE: 96.3 F | HEIGHT: 68 IN | WEIGHT: 262 LBS | DIASTOLIC BLOOD PRESSURE: 72 MMHG | OXYGEN SATURATION: 96 % | SYSTOLIC BLOOD PRESSURE: 108 MMHG | RESPIRATION RATE: 16 BRPM | BODY MASS INDEX: 39.71 KG/M2 | HEART RATE: 84 BPM

## 2021-12-14 PROCEDURE — 99214 OFFICE O/P EST MOD 30 MIN: CPT

## 2021-12-14 RX ORDER — PANTOPRAZOLE 20 MG/1
20 TABLET, DELAYED RELEASE ORAL TWICE DAILY
Qty: 28 | Refills: 0 | Status: DISCONTINUED | COMMUNITY
Start: 2021-11-22 | End: 2021-12-14

## 2021-12-14 RX ORDER — CLARITHROMYCIN 500 MG/1
500 TABLET, FILM COATED ORAL
Qty: 28 | Refills: 0 | Status: DISCONTINUED | COMMUNITY
Start: 2021-11-22 | End: 2021-12-14

## 2021-12-14 RX ORDER — EMPAGLIFLOZIN 10 MG/1
10 TABLET, FILM COATED ORAL DAILY
Refills: 0 | Status: ACTIVE | COMMUNITY

## 2021-12-14 RX ORDER — METFORMIN HYDROCHLORIDE 500 MG/1
500 TABLET, COATED ORAL TWICE DAILY
Qty: 60 | Refills: 0 | Status: ACTIVE | COMMUNITY

## 2021-12-14 RX ORDER — ZINC SULFATE 50(220)MG
CAPSULE ORAL
Refills: 0 | Status: ACTIVE | COMMUNITY

## 2021-12-14 RX ORDER — AMOXICILLIN 500 MG/1
500 TABLET, FILM COATED ORAL
Qty: 56 | Refills: 0 | Status: DISCONTINUED | COMMUNITY
Start: 2021-11-22 | End: 2021-12-14

## 2021-12-14 NOTE — DISCUSSION/SUMMARY
[FreeTextEntry1] : 46 year old M with history of WPW, s/p ablation history in 2000, HTN, HL, Hypertriglyceridemia, Obesity. CVS stable. \par -  BP normal - continue to keep BP log - c/w Amlodipine 5 mg PO daily. \par -  Edema improved -  c/w Lasix 40 mg Po daily\par -  c/w Fenofibrate and start Rosuvastatin 10 mg PO daily and Vascepa (given markedly elevated TG)\par -  Check labs today. \par -  Diet/Exercise/Weight loss counseling\par -  Will return in 6 months\par

## 2021-12-14 NOTE — REASON FOR VISIT
[Follow-Up - Clinic] : a clinic follow-up of [FreeTextEntry1] : 46 year old M with history of WPW, ablation history in 2000, history of pericarditis in 2016, HTN, HL here for followup. He remains on Ozempic. Denies chest pain, dyspnea, orthopnea, PND, edema. Slightly frustrated with insignificant weight loss. Discussed intermittent fasting and lifestyle changes. \par \par DLA 11/12/2021: No significant LE arterial disease. \par Carotid 11/12/2021: No significant disease\par Labs 11/07/2021: TSH 2.96\par Labs 09/17/2021: ALT 83; Glucose 187; A1C 7.5; ; total chol 264; HDL 31; LDL not calc; CBC WNL \par Labs 04/30/2021: ; total chol 257; HDL 29; LDL not calc; AST 74; Glc 194; otherwise normal;CBC WNL; \par \par Note: Not on Lisinopril due to cough. \par \par EXSE 02/13/2020: [facility] protocol 6 min - no EKG changes. No WMA. Accelerated HR. \par Resting echo: Normal LV function without significant valvular abnormalities. \par \par Holter 24 hour - SR with rare PVCs\par \par U/S LE Doppler - No DVT. no reflux\par \par EKG Sinus tachy with incomplete RBBB. \par Labs 11/18/2020: Glc 143; ALT 75; AST 37; Cr normal; ; Total chol 221; HDL 31; ; A1C 7.2\par Labs 01/2020: CMP WNL; A1C 7.0; Total chol 260; HDL 26; ; LDL (cant calculate).

## 2021-12-15 LAB
ALBUMIN SERPL ELPH-MCNC: 5 G/DL
ALP BLD-CCNC: 91 U/L
ALT SERPL-CCNC: 95 U/L
ANION GAP SERPL CALC-SCNC: 14 MMOL/L
AST SERPL-CCNC: 48 U/L
BASOPHILS # BLD AUTO: 0.05 K/UL
BASOPHILS NFR BLD AUTO: 0.6 %
BILIRUB SERPL-MCNC: 0.6 MG/DL
BUN SERPL-MCNC: 20 MG/DL
CALCIUM SERPL-MCNC: 9.8 MG/DL
CHLORIDE SERPL-SCNC: 102 MMOL/L
CHOLEST SERPL-MCNC: 267 MG/DL
CO2 SERPL-SCNC: 25 MMOL/L
CREAT SERPL-MCNC: 1.04 MG/DL
EOSINOPHIL # BLD AUTO: 0.19 K/UL
EOSINOPHIL NFR BLD AUTO: 2.1 %
ESTIMATED AVERAGE GLUCOSE: 143 MG/DL
FOLATE SERPL-MCNC: >20 NG/ML
GLUCOSE SERPL-MCNC: 111 MG/DL
HBA1C MFR BLD HPLC: 6.6 %
HCT VFR BLD CALC: 53.4 %
HDLC SERPL-MCNC: 30 MG/DL
HGB BLD-MCNC: 17 G/DL
IMM GRANULOCYTES NFR BLD AUTO: 0.7 %
IRON SERPL-MCNC: 85 UG/DL
LDLC SERPL CALC-MCNC: 184 MG/DL
LYMPHOCYTES # BLD AUTO: 2.85 K/UL
LYMPHOCYTES NFR BLD AUTO: 31.8 %
MAN DIFF?: NORMAL
MCHC RBC-ENTMCNC: 29.9 PG
MCHC RBC-ENTMCNC: 31.8 GM/DL
MCV RBC AUTO: 93.8 FL
MONOCYTES # BLD AUTO: 0.68 K/UL
MONOCYTES NFR BLD AUTO: 7.6 %
NEUTROPHILS # BLD AUTO: 5.12 K/UL
NEUTROPHILS NFR BLD AUTO: 57.2 %
NONHDLC SERPL-MCNC: 237 MG/DL
PLATELET # BLD AUTO: 204 K/UL
POTASSIUM SERPL-SCNC: 4.4 MMOL/L
PROT SERPL-MCNC: 7.1 G/DL
RBC # BLD: 5.69 M/UL
RBC # FLD: 13.7 %
SODIUM SERPL-SCNC: 141 MMOL/L
TRIGL SERPL-MCNC: 266 MG/DL
VIT B12 SERPL-MCNC: 783 PG/ML
WBC # FLD AUTO: 8.95 K/UL

## 2021-12-16 ENCOUNTER — RX RENEWAL (OUTPATIENT)
Age: 46
End: 2021-12-16

## 2021-12-16 LAB — 24R-OH-CALCIDIOL SERPL-MCNC: 78.4 PG/ML

## 2021-12-17 ENCOUNTER — NON-APPOINTMENT (OUTPATIENT)
Age: 46
End: 2021-12-17

## 2022-02-08 ENCOUNTER — APPOINTMENT (OUTPATIENT)
Dept: HEART AND VASCULAR | Facility: CLINIC | Age: 47
End: 2022-02-08

## 2022-02-22 ENCOUNTER — NON-APPOINTMENT (OUTPATIENT)
Age: 47
End: 2022-02-22

## 2022-03-02 ENCOUNTER — APPOINTMENT (OUTPATIENT)
Dept: HEART AND VASCULAR | Facility: CLINIC | Age: 47
End: 2022-03-02
Payer: COMMERCIAL

## 2022-03-02 DIAGNOSIS — M54.9 DORSALGIA, UNSPECIFIED: ICD-10-CM

## 2022-03-02 DIAGNOSIS — R07.9 CHEST PAIN, UNSPECIFIED: ICD-10-CM

## 2022-03-02 PROCEDURE — 99443: CPT

## 2022-03-21 LAB — H PYLORI AG STL QL: NOT DETECTED

## 2022-05-10 ENCOUNTER — APPOINTMENT (OUTPATIENT)
Dept: HEART AND VASCULAR | Facility: CLINIC | Age: 47
End: 2022-05-10
Payer: COMMERCIAL

## 2022-05-10 VITALS
BODY MASS INDEX: 39.4 KG/M2 | OXYGEN SATURATION: 97 % | DIASTOLIC BLOOD PRESSURE: 68 MMHG | SYSTOLIC BLOOD PRESSURE: 102 MMHG | RESPIRATION RATE: 16 BRPM | HEIGHT: 68 IN | WEIGHT: 260 LBS | HEART RATE: 96 BPM | TEMPERATURE: 98 F

## 2022-05-10 DIAGNOSIS — Z00.00 ENCOUNTER FOR GENERAL ADULT MEDICAL EXAMINATION W/OUT ABNORMAL FINDINGS: ICD-10-CM

## 2022-05-10 PROCEDURE — 99214 OFFICE O/P EST MOD 30 MIN: CPT

## 2022-05-10 RX ORDER — FUROSEMIDE 40 MG/1
40 TABLET ORAL
Refills: 0 | Status: DISCONTINUED | COMMUNITY
End: 2022-05-10

## 2022-05-10 RX ORDER — LANSOPRAZOLE 30 MG/1
30 TABLET, ORALLY DISINTEGRATING ORAL
Qty: 1 | Refills: 3 | Status: DISCONTINUED | COMMUNITY
Start: 2022-04-20 | End: 2022-05-10

## 2022-05-10 RX ORDER — LANSOPRAZOLE 30 MG/1
30 CAPSULE, DELAYED RELEASE ORAL DAILY
Qty: 90 | Refills: 3 | Status: DISCONTINUED | COMMUNITY
Start: 2022-04-18 | End: 2022-05-10

## 2022-05-11 LAB
ALBUMIN SERPL ELPH-MCNC: 5 G/DL
ALP BLD-CCNC: 84 U/L
ALT SERPL-CCNC: 47 U/L
ANION GAP SERPL CALC-SCNC: 12 MMOL/L
AST SERPL-CCNC: 23 U/L
BASOPHILS # BLD AUTO: 0.03 K/UL
BASOPHILS NFR BLD AUTO: 0.3 %
BILIRUB SERPL-MCNC: 0.4 MG/DL
BUN SERPL-MCNC: 16 MG/DL
CALCIUM SERPL-MCNC: 10.2 MG/DL
CHLORIDE SERPL-SCNC: 103 MMOL/L
CHOLEST SERPL-MCNC: 143 MG/DL
CO2 SERPL-SCNC: 25 MMOL/L
CREAT SERPL-MCNC: 0.87 MG/DL
EGFR: 108 ML/MIN/1.73M2
EOSINOPHIL # BLD AUTO: 0.16 K/UL
EOSINOPHIL NFR BLD AUTO: 1.8 %
GLUCOSE SERPL-MCNC: 177 MG/DL
HCT VFR BLD CALC: 52.8 %
HDLC SERPL-MCNC: 33 MG/DL
HGB BLD-MCNC: 17.4 G/DL
IMM GRANULOCYTES NFR BLD AUTO: 0.3 %
LDLC SERPL CALC-MCNC: 75 MG/DL
LYMPHOCYTES # BLD AUTO: 3.11 K/UL
LYMPHOCYTES NFR BLD AUTO: 34.9 %
MAN DIFF?: NORMAL
MCHC RBC-ENTMCNC: 30.1 PG
MCHC RBC-ENTMCNC: 33 GM/DL
MCV RBC AUTO: 91.2 FL
MONOCYTES # BLD AUTO: 0.62 K/UL
MONOCYTES NFR BLD AUTO: 7 %
NEUTROPHILS # BLD AUTO: 4.97 K/UL
NEUTROPHILS NFR BLD AUTO: 55.7 %
NONHDLC SERPL-MCNC: 110 MG/DL
PLATELET # BLD AUTO: 203 K/UL
POTASSIUM SERPL-SCNC: 4.5 MMOL/L
PROT SERPL-MCNC: 7.5 G/DL
RBC # BLD: 5.79 M/UL
RBC # FLD: 13.2 %
SODIUM SERPL-SCNC: 140 MMOL/L
TRIGL SERPL-MCNC: 173 MG/DL
WBC # FLD AUTO: 8.92 K/UL

## 2022-05-11 NOTE — REASON FOR VISIT
[FreeTextEntry1] : 46 year old M with history of WPW, ablation history in 2000, history of pericarditis in 2016, HTN, HL here for followup. He remains on Ozempic. Denies chest pain, dyspnea, orthopnea, PND, edema. Does complain of fatigue and unable to sleep at night. Has not gotten JOS evaluation as yet. Will think about it and let us know when he is ready for sleep apnea evaluation. Remains on Rosuvastatin, Fenofibrate and Vascepa. \par \par DLA 11/12/2021: No significant LE arterial disease. \par Carotid 11/12/2021: No significant disease\par Labs 11/07/2021: TSH 2.96\par Labs 09/17/2021: ALT 83; Glucose 187; A1C 7.5; ; total chol 264; HDL 31; LDL not calc; CBC WNL \par Labs 04/30/2021: ; total chol 257; HDL 29; LDL not calc; AST 74; Glc 194; otherwise normal;CBC WNL; \par \par Note: Not on Lisinopril due to cough. \par \par EXSE 02/13/2020: [facility] protocol 6 min - no EKG changes. No WMA. Accelerated HR. \par Resting echo: Normal LV function without significant valvular abnormalities. \par \par Holter 24 hour - SR with rare PVCs\par \par U/S LE Doppler - No DVT. no reflux\par \par EKG Sinus tachy with incomplete RBBB. \par Labs 11/18/2020: Glc 143; ALT 75; AST 37; Cr normal; ; Total chol 221; HDL 31; ; A1C 7.2\par Labs 01/2020: CMP WNL; A1C 7.0; Total chol 260; HDL 26; ; LDL (cant calculate).  [Follow-Up - Clinic] : a clinic follow-up of

## 2022-05-11 NOTE — PHYSICAL EXAM
[General Appearance - Well Developed] : well developed [Well Groomed] : well groomed [Normal Appearance] : normal appearance [General Appearance - Well Nourished] : well nourished [No Deformities] : no deformities [General Appearance - In No Acute Distress] : no acute distress [Normal Oral Mucosa] : normal oral mucosa [No Oral Pallor] : no oral pallor [No Oral Cyanosis] : no oral cyanosis [Respiration, Rhythm And Depth] : normal respiratory rhythm and effort [Exaggerated Use Of Accessory Muscles For Inspiration] : no accessory muscle use [Auscultation Breath Sounds / Voice Sounds] : lungs were clear to auscultation bilaterally [Heart Rate And Rhythm] : heart rate and rhythm were normal [Heart Sounds] : normal S1 and S2 [Murmurs] : no murmurs present [Abdomen Soft] : soft [Abdomen Tenderness] : non-tender [Abdomen Mass (___ Cm)] : no abdominal mass palpated [Abnormal Walk] : normal gait [Gait - Sufficient For Exercise Testing] : the gait was sufficient for exercise testing [Nail Clubbing] : no clubbing of the fingernails [Cyanosis, Localized] : no localized cyanosis [Petechial Hemorrhages (___cm)] : no petechial hemorrhages [] : no ischemic changes [FreeTextEntry1] : Edema - 2+ pitting [Oriented To Time, Place, And Person] : oriented to person, place, and time [Affect] : the affect was normal [Mood] : the mood was normal [No Anxiety] : not feeling anxious

## 2022-05-11 NOTE — DISCUSSION/SUMMARY
[FreeTextEntry1] : 46 year old M with history of WPW, s/p ablation history in 2000, HTN, HL, Hypertriglyceridemia, Obesity. CVS stable. \par -  BP normal - continue to keep BP log - c/w Amlodipine 5 mg PO daily. \par -  Edema improved -  has not needed to take. \par -  c/w Fenofibrate and Rosuvastatin 10 mg PO daily and Vascepa (given markedly elevated TG)\par -  Check labs today. \par -  Diet/Exercise/Weight loss counseling\par -  Will return in 6 months\par

## 2022-05-13 ENCOUNTER — APPOINTMENT (OUTPATIENT)
Dept: HEART AND VASCULAR | Facility: CLINIC | Age: 47
End: 2022-05-13
Payer: COMMERCIAL

## 2022-05-13 ENCOUNTER — NON-APPOINTMENT (OUTPATIENT)
Age: 47
End: 2022-05-13

## 2022-05-13 PROCEDURE — 99214 OFFICE O/P EST MOD 30 MIN: CPT | Mod: 95

## 2022-05-13 RX ORDER — ROSUVASTATIN CALCIUM 40 MG/1
40 TABLET, FILM COATED ORAL
Refills: 0 | Status: ACTIVE | COMMUNITY

## 2022-05-20 NOTE — DISCUSSION/SUMMARY
[FreeTextEntry1] : 46 year old M with history of WPW, ablation history in 2000, history of pericarditis in 2016, HTN, HL here for followup. He remains on Ozempic, Rosuvastatin, Fenofibrate and Vascepa. \par \par Somnolence- \par He is working on the fact that he tends to be very sleepy during the day and there is a concern for sleep apnea. He would consider sleep testing and let us know if ready to test. \par \par #Mixed dyslipidemia\par #Severe hypertriglyceridemia - likely polygenic disorder\par #Hyperlipidemia- markedly improved with weight loss. \par -c/w fenofibrate 200mg, vascepa which are at optimal doses\par \par #Obesity\par #Metabolic syndrome\par #DM2\par -Continue ozempic to 1mg\par -Will consider 1.5 on next visit if not further weight loss. \par -Unclear why his weight appears to fluctuate widely without change in diet as per his report. \par \par  #HTN: well controlled, continue amlodipine\par He will track those numbers and try taking the medication at night and let us know if still elevated. \par \par \par

## 2022-05-20 NOTE — REASON FOR VISIT
[Hyperlipidemia] : hyperlipidemia [Hypertension] : hypertension [Home] : at home, [unfilled] , at the time of the visit. [Medical Office: ([facility])___] : at the medical office located in  [Verbal consent obtained from patient] : the patient, [unfilled]

## 2022-05-20 NOTE — HISTORY OF PRESENT ILLNESS
[FreeTextEntry1] : 46 year old M with history of WPW, ablation history in 2000, history of pericarditis in 2016, HTN, HL here for followup. He remains on Ozempic, Rosuvastatin, Fenofibrate and Vascepa. \par \par He reports that his blood pressure has been up to about 150's to 160's. He reports that his blood pressure is a little lower than the 90's. He was taking his amlodipine in am and then plans to shift to the evening because the blood pressures were high at night. \par \par He has lost weight, but knows that his weight loss is really from the medication and is concerned about gaining weight again. \par \par His current weight is 242 lbs. \par \par He is now moving around and doing more things and not getting out of breath. He is happy that he is walking around his yard and getting more activity. He's also using the exercise equipment whenever free. \par \par Prior History- \par February ED -> LE weakness and LE swelling due to prolonged driving. \par Extremely fatigued. \par IS not sleeping well and falling asleep during the day and while doing work. \par BP has been 150 at home \par \par Lipids-\par Chol 15 Sep 2021: , HDL 31mg, Non . A1c - 7.5%\par chol-221, HDL 31, , HDL- 31, non-HDL- 191\par HbA1C 7.2\par DLA 11/12/2021: No significant LE arterial disease. \par Carotid 11/12/2021: No significant disease\par \par EXSE 02/13/2020: [facility] protocol 6 min - no EKG changes. No WMA. Accelerated HR. \par Resting echo: Normal LV function without significant valvular abnormalities. \par \par Holter 24 hour - SR with rare PVCs

## 2022-07-14 ENCOUNTER — APPOINTMENT (OUTPATIENT)
Dept: GASTROENTEROLOGY | Facility: CLINIC | Age: 47
End: 2022-07-14

## 2022-08-16 ENCOUNTER — APPOINTMENT (OUTPATIENT)
Dept: HEART AND VASCULAR | Facility: CLINIC | Age: 47
End: 2022-08-16

## 2022-08-16 PROCEDURE — 36415 COLL VENOUS BLD VENIPUNCTURE: CPT

## 2022-08-18 LAB
ALBUMIN SERPL ELPH-MCNC: 5.1 G/DL
ALP BLD-CCNC: 93 U/L
ALT SERPL-CCNC: 41 U/L
ANION GAP SERPL CALC-SCNC: 17 MMOL/L
AST SERPL-CCNC: 26 U/L
BASOPHILS # BLD AUTO: 0.05 K/UL
BASOPHILS NFR BLD AUTO: 0.6 %
BILIRUB SERPL-MCNC: 0.5 MG/DL
BUN SERPL-MCNC: 15 MG/DL
CALCIUM SERPL-MCNC: 10 MG/DL
CHLORIDE SERPL-SCNC: 105 MMOL/L
CHOLEST SERPL-MCNC: 166 MG/DL
CO2 SERPL-SCNC: 23 MMOL/L
CREAT SERPL-MCNC: 1.04 MG/DL
EGFR: 90 ML/MIN/1.73M2
EOSINOPHIL # BLD AUTO: 0.18 K/UL
EOSINOPHIL NFR BLD AUTO: 2.2 %
GLUCOSE SERPL-MCNC: 120 MG/DL
HCT VFR BLD CALC: 55.3 %
HDLC SERPL-MCNC: 35 MG/DL
HGB BLD-MCNC: 17.2 G/DL
IMM GRANULOCYTES NFR BLD AUTO: 0.5 %
LDLC SERPL CALC-MCNC: 88 MG/DL
LYMPHOCYTES # BLD AUTO: 2.88 K/UL
LYMPHOCYTES NFR BLD AUTO: 34.5 %
MAN DIFF?: NORMAL
MCHC RBC-ENTMCNC: 29.4 PG
MCHC RBC-ENTMCNC: 31.1 GM/DL
MCV RBC AUTO: 94.5 FL
MONOCYTES # BLD AUTO: 0.63 K/UL
MONOCYTES NFR BLD AUTO: 7.5 %
NEUTROPHILS # BLD AUTO: 4.57 K/UL
NEUTROPHILS NFR BLD AUTO: 54.7 %
NONHDLC SERPL-MCNC: 131 MG/DL
PLATELET # BLD AUTO: 205 K/UL
POTASSIUM SERPL-SCNC: 5.2 MMOL/L
PROT SERPL-MCNC: 7.3 G/DL
RBC # BLD: 5.85 M/UL
RBC # FLD: 13.9 %
SODIUM SERPL-SCNC: 146 MMOL/L
TRIGL SERPL-MCNC: 214 MG/DL
WBC # FLD AUTO: 8.35 K/UL

## 2022-08-19 ENCOUNTER — NON-APPOINTMENT (OUTPATIENT)
Age: 47
End: 2022-08-19

## 2022-08-23 ENCOUNTER — NON-APPOINTMENT (OUTPATIENT)
Age: 47
End: 2022-08-23

## 2022-08-23 LAB
ESTIMATED AVERAGE GLUCOSE: 134 MG/DL
HBA1C MFR BLD HPLC: 6.3 %

## 2022-09-01 ENCOUNTER — APPOINTMENT (OUTPATIENT)
Dept: GASTROENTEROLOGY | Facility: CLINIC | Age: 47
End: 2022-09-01

## 2022-09-01 VITALS
HEIGHT: 68 IN | OXYGEN SATURATION: 98 % | HEART RATE: 102 BPM | WEIGHT: 260 LBS | DIASTOLIC BLOOD PRESSURE: 80 MMHG | BODY MASS INDEX: 39.4 KG/M2 | TEMPERATURE: 98.3 F | SYSTOLIC BLOOD PRESSURE: 110 MMHG

## 2022-09-01 DIAGNOSIS — Z12.11 ENCOUNTER FOR SCREENING FOR MALIGNANT NEOPLASM OF COLON: ICD-10-CM

## 2022-09-01 PROCEDURE — 99213 OFFICE O/P EST LOW 20 MIN: CPT

## 2022-09-01 NOTE — ASSESSMENT
[FreeTextEntry1] : Continue with diet, weight loss, healthy eating.\par \par Due for a repeat colonoscopy 2026.

## 2022-09-01 NOTE — HISTORY OF PRESENT ILLNESS
[FreeTextEntry1] : Mr. Lopes is a pleasant 47M h/o WPW s/p ablation 2000, paricarditis 2016, HTN, HLD, metabolic syndrome who returns for f/u.\par \par Feels well, no complaints.\par \par Previously elevated liver enzymes, however returned to normal.\par \par He underwent an EGD/colonoscopy with myself 11/21 showing H. pylori on gastric biopsies (treated with amoxicillin/clarithromycin triple therapy), and two sub-cm colonic tubular adenomas.\par \par Had a confirmatory H. pylori stool Ag 3/22 showing eradication of his infection.\par \par Due for a repeat colonoscopy in 2026.\par \par Otherwise feels well, no change in his health.\par \par GERD has been well controlled, was able to come off of his PPI medications through weight loss.

## 2022-11-11 ENCOUNTER — APPOINTMENT (OUTPATIENT)
Dept: HEART AND VASCULAR | Facility: CLINIC | Age: 47
End: 2022-11-11

## 2022-11-11 ENCOUNTER — NON-APPOINTMENT (OUTPATIENT)
Age: 47
End: 2022-11-11

## 2022-11-11 VITALS
TEMPERATURE: 97.1 F | SYSTOLIC BLOOD PRESSURE: 134 MMHG | OXYGEN SATURATION: 97 % | HEIGHT: 68 IN | HEART RATE: 110 BPM | WEIGHT: 260 LBS | BODY MASS INDEX: 39.4 KG/M2 | DIASTOLIC BLOOD PRESSURE: 84 MMHG

## 2022-11-11 PROCEDURE — 93000 ELECTROCARDIOGRAM COMPLETE: CPT

## 2022-11-11 PROCEDURE — 99214 OFFICE O/P EST MOD 30 MIN: CPT | Mod: 25

## 2022-11-12 NOTE — DISCUSSION/SUMMARY
[EKG obtained to assist in diagnosis and management of assessed problem(s)] : EKG obtained to assist in diagnosis and management of assessed problem(s) [FreeTextEntry1] : 47 year old M with history of WPW, ablation history in 2000, history of pericarditis in 2016, HTN, HL here for followup. He remains on Ozempic, Rosuvastatin, Fenofibrate and Vascepa. \par \par \par Mixed dyslipidemia- markedly improved with weight loss. \par Severe hypertriglyceridemia - likely polygenic disorder\par -c/w Crestor 40mg, fenofibrate 200mg, vascepa which are at optimal doses\par - gave order for repeat fasting lipids \par \par Obesity, DM2; previous A1c 6.3%\par -Continue ozempic to 1mg, Jardiance 10mg qd \par -Will investigate coverage for [employer] to try and facilitate more weight loss \par - Encouraged patient to continue healthy exercise and eating habits, focusing on a Mediterranean style of eating w/ more plant based foods in general, and aiming for the recommended 150 minutes per week of moderate physical activity.\par - gave order for repeat A1c \par \par HTN: well controlled, continue amlodipine\par - continue to monitor at home.\par \par RTC in 3 months. \par \par \par

## 2022-11-12 NOTE — HISTORY OF PRESENT ILLNESS
[FreeTextEntry1] : 47 year old M with history of WPW, ablation history in 2000, history of pericarditis in 2016, HTN, HL here for followup. He remains on Ozempic, Rosuvastatin, Fenofibrate and Vascepa. \par \par He reports his blood pressure is better controlled. Patient denies any chest pain, SOB, palpitations, orthopnea, PND or LE edema.\par \par He has lost weight, but knows that his weight loss is really from the medication and is concerned about gaining weight again. He reports his weight has been fluctuating a lot. \par \par \par Prior History- \par February ED -> LE weakness and LE swelling due to prolonged driving. \par Extremely fatigued. \par IS not sleeping well and falling asleep during the day and while doing work. \par BP has been 150 at home \par \par Lipids-\par Chol 15 Sep 2021: , HDL 31mg, Non . A1c - 7.5%\par chol-221, HDL 31, , HDL- 31, non-HDL- 191\par HbA1C 7.2\par DLA 11/12/2021: No significant LE arterial disease. \par Carotid 11/12/2021: No significant disease\par \par EXSE 02/13/2020: [facility] protocol 6 min - no EKG changes. No WMA. Accelerated HR. \par Resting echo: Normal LV function without significant valvular abnormalities. \par \par Holter 24 hour - SR with rare PVCs

## 2022-11-15 ENCOUNTER — APPOINTMENT (OUTPATIENT)
Dept: HEART AND VASCULAR | Facility: CLINIC | Age: 47
End: 2022-11-15

## 2022-11-15 VITALS
WEIGHT: 260 LBS | HEIGHT: 68 IN | OXYGEN SATURATION: 95 % | TEMPERATURE: 98 F | SYSTOLIC BLOOD PRESSURE: 122 MMHG | DIASTOLIC BLOOD PRESSURE: 80 MMHG | RESPIRATION RATE: 17 BRPM | HEART RATE: 99 BPM | BODY MASS INDEX: 39.4 KG/M2

## 2022-11-15 PROCEDURE — 99214 OFFICE O/P EST MOD 30 MIN: CPT

## 2022-11-15 NOTE — DISCUSSION/SUMMARY
[FreeTextEntry1] : 47 year old M with history of WPW, s/p ablation history in 2000, HTN, HL, Hypertriglyceridemia, Obesity. CVS stable. \par - Still awaiting Sleep apnea testing. To see Dr. Olivas\par - On Ozempic (1.5 dose) - treatment per Dr. Wadsworth. \par -  BP normal - continue to keep BP log - c/w Amlodipine 5 mg PO daily. \par -  Edema improved -  has not needed to take. \par -  c/w Fenofibrate and Rosuvastatin 10 mg PO daily and Vascepa (given markedly elevated TG)\par -  Check labs today. \par -  Diet/Exercise/Weight loss counseling\par -  Will return in 6 months\par

## 2022-11-15 NOTE — REASON FOR VISIT
[Follow-Up - Clinic] : a clinic follow-up of [FreeTextEntry1] : 47 year old M with history of WPW, ablation history in 2000, history of pericarditis in 2016, HTN, HL here for followup. He remains on Ozempic.Denies chest pain, dyspnea, orthopnea, PND, edema. Does complain of fatigue and unable to sleep at night. Has not gotten JOS evaluation as yet. Saw Dr. Wadsworth, and was recommended to increase Ozempic.  Remains on Rosuvastatin, Fenofibrate and Vascepa. \par \par Labs 08/23/2022: A1C 6.3; CBC WNL; ; HDL 35; Total chol 166; LDL 88 (Per Dr. Wadsworth, to remain on current doses of Statin, Vascpea and Fibrate). CMP WNL. \par \par DLA 11/12/2021: No significant LE arterial disease. \par Carotid 11/12/2021: No significant disease\par Labs 11/07/2021: TSH 2.96\par Labs 09/17/2021: ALT 83; Glucose 187; A1C 7.5; ; total chol 264; HDL 31; LDL not calc; CBC WNL \par Labs 04/30/2021: ; total chol 257; HDL 29; LDL not calc; AST 74; Glc 194; otherwise normal;CBC WNL; \par \par Note: Not on Lisinopril due to cough. \par \par EXSE 02/13/2020: [facility] protocol 6 min - no EKG changes. No WMA. Accelerated HR. \par Resting echo: Normal LV function without significant valvular abnormalities. \par \par Holter 24 hour - SR with rare PVCs\par \par U/S LE Doppler - No DVT. no reflux\par \par EKG Sinus tachy with incomplete RBBB. \par Labs 11/18/2020: Glc 143; ALT 75; AST 37; Cr normal; ; Total chol 221; HDL 31; ; A1C 7.2\par Labs 01/2020: CMP WNL; A1C 7.0; Total chol 260; HDL 26; ; LDL (cant calculate).

## 2022-11-16 LAB
ALBUMIN SERPL ELPH-MCNC: 5 G/DL
ALP BLD-CCNC: 93 U/L
ALT SERPL-CCNC: 59 U/L
ANION GAP SERPL CALC-SCNC: 13 MMOL/L
AST SERPL-CCNC: 30 U/L
BASOPHILS # BLD AUTO: 0.03 K/UL
BASOPHILS NFR BLD AUTO: 0.3 %
BILIRUB SERPL-MCNC: 0.4 MG/DL
BUN SERPL-MCNC: 20 MG/DL
CALCIUM SERPL-MCNC: 10 MG/DL
CHLORIDE SERPL-SCNC: 99 MMOL/L
CO2 SERPL-SCNC: 27 MMOL/L
CREAT SERPL-MCNC: 1 MG/DL
EGFR: 94 ML/MIN/1.73M2
EOSINOPHIL # BLD AUTO: 0.2 K/UL
EOSINOPHIL NFR BLD AUTO: 2.1 %
ESTIMATED AVERAGE GLUCOSE: 143 MG/DL
GLUCOSE SERPL-MCNC: 140 MG/DL
HBA1C MFR BLD HPLC: 6.6 %
HCT VFR BLD CALC: 56.3 %
HGB BLD-MCNC: 18.4 G/DL
IMM GRANULOCYTES NFR BLD AUTO: 0.5 %
LYMPHOCYTES # BLD AUTO: 2.88 K/UL
LYMPHOCYTES NFR BLD AUTO: 30.8 %
MAN DIFF?: NORMAL
MCHC RBC-ENTMCNC: 30.3 PG
MCHC RBC-ENTMCNC: 32.7 GM/DL
MCV RBC AUTO: 92.6 FL
MONOCYTES # BLD AUTO: 0.64 K/UL
MONOCYTES NFR BLD AUTO: 6.8 %
NEUTROPHILS # BLD AUTO: 5.56 K/UL
NEUTROPHILS NFR BLD AUTO: 59.5 %
PLATELET # BLD AUTO: 207 K/UL
POTASSIUM SERPL-SCNC: 4.5 MMOL/L
PROT SERPL-MCNC: 7.8 G/DL
RBC # BLD: 6.08 M/UL
RBC # FLD: 14.1 %
SODIUM SERPL-SCNC: 140 MMOL/L
WBC # FLD AUTO: 9.36 K/UL

## 2022-11-17 ENCOUNTER — NON-APPOINTMENT (OUTPATIENT)
Age: 47
End: 2022-11-17

## 2022-11-17 LAB
CHOLEST SERPL-MCNC: 229 MG/DL
HDLC SERPL-MCNC: 34 MG/DL
LDLC SERPL CALC-MCNC: 123 MG/DL
NONHDLC SERPL-MCNC: 195 MG/DL
TRIGL SERPL-MCNC: 360 MG/DL

## 2022-11-23 ENCOUNTER — APPOINTMENT (OUTPATIENT)
Dept: PULMONOLOGY | Facility: CLINIC | Age: 47
End: 2022-11-23

## 2022-11-23 VITALS
HEIGHT: 68 IN | SYSTOLIC BLOOD PRESSURE: 120 MMHG | HEART RATE: 107 BPM | WEIGHT: 260 LBS | OXYGEN SATURATION: 95 % | DIASTOLIC BLOOD PRESSURE: 78 MMHG | RESPIRATION RATE: 17 BRPM | TEMPERATURE: 98.4 F | BODY MASS INDEX: 39.4 KG/M2

## 2022-11-23 DIAGNOSIS — R06.83 SNORING: ICD-10-CM

## 2022-11-23 PROCEDURE — 99203 OFFICE O/P NEW LOW 30 MIN: CPT

## 2022-11-23 NOTE — ASSESSMENT
[FreeTextEntry1] : 47-year-old man with significant sleep disturbance due to his brothers disability and being his sole caretaker.\par \par Patient also has gained significant weight in the last 2 years, currently he is experiencing significant sleep disturbance and daytime tiredness.\par \par Will do a home sleep study as patient is not able to leave his brother alone.\par \par Will discuss further after the sleep study is done, patient is hesitant to the whole idea of CPAP masks due to the situation with his brother.  Patient sleeps in a recliner next to his brother's bed and he only sleeps a couple hours at a stretch.

## 2022-11-23 NOTE — HISTORY OF PRESENT ILLNESS
[FreeTextEntry1] : Lucas Mahan\par Dr. Streeter\par 47 year old man with history of diabetes, htn is here in the sleep center to address sleep apnea. \par Patient has a disabled brother who is cognitively impaired and has seizures.  Patient's sleep issues stem from taking care of his brother.  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 His brother does not sleep well at night and the patient has to be awake all the time to be with his brother.  Patients sleep is disturbed and he estimates getting about 4 to 5 hours sleep during the 24 hr period and not in a continuous fashion.  There are days where patient is awake until 4 AM and then sleeps for couple hours wakes up and goes back to bed few hours later for 3 more hours.\par \par Patient is sleepy with [place] sleepiness score of 14.  Patient does not know if there is snoring or witnessed apneas.  He estimates 5 hrs of sleep in the 24 hr time period.  He feels tired when he wakes up.  Patient drinks 1 cup of coffee during the daytime. patient does not have any headaches or nocturia.  He is not sleepy while driving.\par \par He sleeps in a recliner next to his brothers bed.

## 2022-11-28 RX ORDER — SEMAGLUTIDE 1.7 MG/.75ML
1.7 INJECTION, SOLUTION SUBCUTANEOUS
Qty: 1 | Refills: 3 | Status: DISCONTINUED | COMMUNITY
Start: 2022-11-11 | End: 2022-11-28

## 2022-12-29 ENCOUNTER — RX RENEWAL (OUTPATIENT)
Age: 47
End: 2022-12-29

## 2023-02-13 ENCOUNTER — APPOINTMENT (OUTPATIENT)
Dept: HEART AND VASCULAR | Facility: CLINIC | Age: 48
End: 2023-02-13
Payer: COMMERCIAL

## 2023-02-13 PROCEDURE — XXXXX: CPT | Mod: 1L

## 2023-02-15 LAB
ALBUMIN SERPL ELPH-MCNC: 4.9 G/DL
ALP BLD-CCNC: 84 U/L
ALT SERPL-CCNC: 36 U/L
ANION GAP SERPL CALC-SCNC: 14 MMOL/L
AST SERPL-CCNC: 28 U/L
BASOPHILS # BLD AUTO: 0.04 K/UL
BASOPHILS NFR BLD AUTO: 0.4 %
BILIRUB SERPL-MCNC: 0.6 MG/DL
BUN SERPL-MCNC: 25 MG/DL
CALCIUM SERPL-MCNC: 9.8 MG/DL
CHLORIDE SERPL-SCNC: 101 MMOL/L
CHOLEST SERPL-MCNC: 164 MG/DL
CO2 SERPL-SCNC: 24 MMOL/L
CREAT SERPL-MCNC: 1.24 MG/DL
EGFR: 73 ML/MIN/1.73M2
EOSINOPHIL # BLD AUTO: 0.14 K/UL
EOSINOPHIL NFR BLD AUTO: 1.5 %
ESTIMATED AVERAGE GLUCOSE: 140 MG/DL
GLUCOSE SERPL-MCNC: 146 MG/DL
HBA1C MFR BLD HPLC: 6.5 %
HCT VFR BLD CALC: 53.3 %
HDLC SERPL-MCNC: 33 MG/DL
HGB BLD-MCNC: 17.8 G/DL
IMM GRANULOCYTES NFR BLD AUTO: 0.7 %
LDLC SERPL CALC-MCNC: 83 MG/DL
LYMPHOCYTES # BLD AUTO: 2.91 K/UL
LYMPHOCYTES NFR BLD AUTO: 31 %
MAN DIFF?: NORMAL
MCHC RBC-ENTMCNC: 30.4 PG
MCHC RBC-ENTMCNC: 33.4 GM/DL
MCV RBC AUTO: 91 FL
MONOCYTES # BLD AUTO: 0.69 K/UL
MONOCYTES NFR BLD AUTO: 7.3 %
NEUTROPHILS # BLD AUTO: 5.54 K/UL
NEUTROPHILS NFR BLD AUTO: 59.1 %
NONHDLC SERPL-MCNC: 131 MG/DL
PLATELET # BLD AUTO: 186 K/UL
POTASSIUM SERPL-SCNC: 4.2 MMOL/L
PROT SERPL-MCNC: 7.6 G/DL
RBC # BLD: 5.86 M/UL
RBC # FLD: 13.6 %
SODIUM SERPL-SCNC: 139 MMOL/L
TRIGL SERPL-MCNC: 240 MG/DL
TSH SERPL-ACNC: 1.97 UIU/ML
WBC # FLD AUTO: 9.39 K/UL

## 2023-02-16 ENCOUNTER — NON-APPOINTMENT (OUTPATIENT)
Age: 48
End: 2023-02-16

## 2023-05-23 ENCOUNTER — APPOINTMENT (OUTPATIENT)
Dept: HEART AND VASCULAR | Facility: CLINIC | Age: 48
End: 2023-05-23
Payer: COMMERCIAL

## 2023-05-23 VITALS
TEMPERATURE: 98 F | OXYGEN SATURATION: 97 % | RESPIRATION RATE: 16 BRPM | HEIGHT: 68 IN | SYSTOLIC BLOOD PRESSURE: 112 MMHG | WEIGHT: 270 LBS | BODY MASS INDEX: 40.92 KG/M2 | HEART RATE: 104 BPM | DIASTOLIC BLOOD PRESSURE: 80 MMHG

## 2023-05-23 DIAGNOSIS — R06.00 DYSPNEA, UNSPECIFIED: ICD-10-CM

## 2023-05-23 PROCEDURE — 99214 OFFICE O/P EST MOD 30 MIN: CPT

## 2023-05-23 RX ORDER — SEMAGLUTIDE 2.68 MG/ML
8 INJECTION, SOLUTION SUBCUTANEOUS
Qty: 3 | Refills: 3 | Status: DISCONTINUED | COMMUNITY
Start: 2021-11-05 | End: 2023-05-23

## 2023-05-23 RX ORDER — SEMAGLUTIDE 1.34 MG/ML
2 INJECTION, SOLUTION SUBCUTANEOUS
Qty: 6 | Refills: 3 | Status: DISCONTINUED | COMMUNITY
Start: 1900-01-01 | End: 2023-05-23

## 2023-05-23 NOTE — DISCUSSION/SUMMARY
[FreeTextEntry1] : 47 year old M with history of WPW, s/p ablation history in 2000, HTN, HL, Hypertriglyceridemia, Obesity. Now with progressive swelling. +Dyspnea \par - Check Stress Echo. T/C CCTA pending results. \par - Check labs, including Lp(a), B12, Mag\par - Prescribed Furosemide 40 mg x 10- days by PMD. \par - To start CPAP machine soon. To see Dr. Olivas\par - No longer on Ozempic. \par -  BP normal - continue to keep BP log - c/w Amlodipine 5 mg PO daily. \par -  Edema improved -  has not needed to take. \par -  c/w Fenofibrate and Rosuvastatin 10 mg PO daily and Vascepa (given markedly elevated TG)\par -  Check labs today. \par -  Diet/Exercise/Weight loss counseling\par -  Will return in 6 months\par

## 2023-05-23 NOTE — REASON FOR VISIT
[Follow-Up - Clinic] : a clinic follow-up of [FreeTextEntry1] : 47 year old M with history of WPW, ablation history in 2000, history of pericarditis in 2016, HTN, HL here for followup. No longer on Ozempic because didn’t help.Getting swelling and dyspnea on exertion. Denies chest pain, orthopnea, PND, edema. Does complain of fatigue and unable to sleep at night. CPAP machine delivered on weekend however has not started it.  Sees Dr. Wadsworth soon.  Remains on Rosuvastatin, Fenofibrate and Vascepa. \par \par Labs 02/15/2023: Hgb 17.8; Hct 53.3; TSH 1.97; A1C 6.5; LDL 83; ; Total chol 164; LDL 83\par Labs 08/23/2022: A1C 6.3; CBC WNL; ; HDL 35; Total chol 166; LDL 88 (Per Dr. Wadsworth, to remain on current doses of Statin, Vascpea and Fibrate). CMP WNL. \par \par DLA 11/12/2021: No significant LE arterial disease. \par Carotid 11/12/2021: No significant disease\par Labs 11/07/2021: TSH 2.96\par Labs 09/17/2021: ALT 83; Glucose 187; A1C 7.5; ; total chol 264; HDL 31; LDL not calc; CBC WNL \par Labs 04/30/2021: ; total chol 257; HDL 29; LDL not calc; AST 74; Glc 194; otherwise normal;CBC WNL; \par \par Note: Not on Lisinopril due to cough. \par \par EXSE 02/13/2020: [facility] protocol 6 min - no EKG changes. No WMA. Accelerated HR. \par Resting echo: Normal LV function without significant valvular abnormalities. \par \par Holter 24 hour - SR with rare PVCs\par \par U/S LE Doppler - No DVT. no reflux\par \par EKG Sinus tachy with incomplete RBBB. \par Labs 11/18/2020: Glc 143; ALT 75; AST 37; Cr normal; ; Total chol 221; HDL 31; ; A1C 7.2\par Labs 01/2020: CMP WNL; A1C 7.0; Total chol 260; HDL 26; ; LDL (cant calculate).

## 2023-05-24 ENCOUNTER — APPOINTMENT (OUTPATIENT)
Dept: HEART AND VASCULAR | Facility: CLINIC | Age: 48
End: 2023-05-24
Payer: COMMERCIAL

## 2023-05-24 PROCEDURE — XXXXX: CPT | Mod: 1L

## 2023-05-26 LAB
ALBUMIN SERPL ELPH-MCNC: 4.9 G/DL
ALP BLD-CCNC: 106 U/L
ALT SERPL-CCNC: 49 U/L
ANION GAP SERPL CALC-SCNC: 13 MMOL/L
APO LP(A) SERPL-MCNC: 19.6 NMOL/L
AST SERPL-CCNC: 28 U/L
BILIRUB SERPL-MCNC: 0.5 MG/DL
BUN SERPL-MCNC: 25 MG/DL
CALCIUM SERPL-MCNC: 9.6 MG/DL
CHLORIDE SERPL-SCNC: 97 MMOL/L
CHOLEST SERPL-MCNC: 222 MG/DL
CO2 SERPL-SCNC: 31 MMOL/L
CREAT SERPL-MCNC: 1.09 MG/DL
EGFR: 85 ML/MIN/1.73M2
ESTIMATED AVERAGE GLUCOSE: 154 MG/DL
GLUCOSE SERPL-MCNC: 189 MG/DL
HBA1C MFR BLD HPLC: 7 %
HDLC SERPL-MCNC: 34 MG/DL
LDLC SERPL CALC-MCNC: NORMAL MG/DL
MAGNESIUM SERPL-MCNC: 1.9 MG/DL
NONHDLC SERPL-MCNC: 188 MG/DL
POTASSIUM SERPL-SCNC: 4.8 MMOL/L
PROT SERPL-MCNC: 7.2 G/DL
SODIUM SERPL-SCNC: 141 MMOL/L
TRIGL SERPL-MCNC: 533 MG/DL
VIT B12 SERPL-MCNC: 468 PG/ML

## 2023-06-01 ENCOUNTER — NON-APPOINTMENT (OUTPATIENT)
Age: 48
End: 2023-06-01

## 2023-06-01 RX ORDER — FUROSEMIDE 40 MG/1
40 TABLET ORAL DAILY
Qty: 30 | Refills: 11 | Status: ACTIVE | COMMUNITY
Start: 2020-10-30 | End: 1900-01-01

## 2023-06-23 ENCOUNTER — APPOINTMENT (OUTPATIENT)
Dept: HEART AND VASCULAR | Facility: CLINIC | Age: 48
End: 2023-06-23
Payer: COMMERCIAL

## 2023-06-23 VITALS
BODY MASS INDEX: 42.44 KG/M2 | HEIGHT: 68 IN | DIASTOLIC BLOOD PRESSURE: 93 MMHG | WEIGHT: 280 LBS | OXYGEN SATURATION: 93 % | SYSTOLIC BLOOD PRESSURE: 151 MMHG | TEMPERATURE: 98 F | HEART RATE: 109 BPM

## 2023-06-23 VITALS — DIASTOLIC BLOOD PRESSURE: 84 MMHG | SYSTOLIC BLOOD PRESSURE: 122 MMHG

## 2023-06-23 DIAGNOSIS — R60.0 LOCALIZED EDEMA: ICD-10-CM

## 2023-06-23 PROCEDURE — 99215 OFFICE O/P EST HI 40 MIN: CPT | Mod: 25

## 2023-06-23 PROCEDURE — 93000 ELECTROCARDIOGRAM COMPLETE: CPT

## 2023-06-23 NOTE — REVIEW OF SYSTEMS
[Feeling Fatigued] : feeling fatigued [Dyspnea on exertion] : dyspnea during exertion [Lower Ext Edema] : lower extremity edema [Fever] : no fever [Chills] : no chills [SOB] : no shortness of breath [Chest Discomfort] : no chest discomfort [Leg Claudication] : no intermittent leg claudication [Palpitations] : no palpitations [Orthopnea] : no orthopnea [PND] : no PND [Syncope] : no syncope [Cough] : no cough [Dizziness] : no dizziness

## 2023-06-23 NOTE — ASSESSMENT
[FreeTextEntry1] : 47 year old M with history of WPW, ablation history in 2000, history of pericarditis in 2016, HTN, HL here for follow-up. \par \par Mixed dyslipidemia- markedly improved with weight loss\par - Recent lipids reveal severe hypertriglyceridemia at 533 - likely polygenic disorder\par -Continue with Crestor 40 mg, fenofibrate 200 mg and Vascepa 2 G BID \par \par Obesity, DM2; most recent A1c 7%\par -Continue Jardiance 10 mg QD and Metformin 500 mg BID\par - Will order Mounjaro for weight loss and DM management - PA pending \par - Encouraged patient to continue healthy exercise and eating habits, focusing on a Mediterranean style of eating w/ more plant based foods in general, and aiming for the recommended 150 minutes per week of moderate physical activity\par - Continue follow-up with Endocrine\par \par HTN\par - BP elevated on initial reading, better-controlled on repeat\par - Pt. is on Amlodipine 5 mg daily for his BP - consider the medication as etiology of LE swelling\par \par LE swelling: \par - Will discuss with Dr. Streeter - he is pending a CCTA and stress echocardiogram. If workup is unrevealing consider stopping Amlodipine\par - RLE US (02/2022) revealed no DVT\par - For now, continue diuresis with Lasix 40 mg daily. Recommended conservative measures to improve edema such as compression socks, elevation of legs while sitting and decreased salt intake \par \par Pt.to RTC in 4-6 months for follow-up\par \par

## 2023-06-23 NOTE — HISTORY OF PRESENT ILLNESS
[FreeTextEntry1] : 47 year old M with history of WPW, ablation history in , history of pericarditis in 2016, HTN, HL here for follow-up. \par \par He remains on Rosuvastatin, Fenofibrate and Vascepa. He is no longer on Ozempic as it was not working well. His weight fluctuates daily.\par \par He reports increased fatigue. He recently received a CPAP machine for hx of JOS but has not yet used it.\par \par He recently saw Cardiologist, Dr. Streeter and reported leg swelling and dyspnea on exertion. Stress echocardiogram and CCTA pending. He is taking Furosemide 40 mg daily with good urinary response and no relief of swelling. Last renal function/electrolytes from May were normal. \par \par Lipid profile (2023): T, TC: 222, HDL: 35, LDL: unable to calculate \par A1c: 7%\par LP(a) 19.6\par \par Lipids-\par Chol 15 Sep 2021: , HDL 31, Non . A1c - 7.5%\par chol-221, HDL 31, , HDL- 31, non-HDL- 191\par HbA1C 7.2\par DLA 2021: No significant LE arterial disease. \par Carotid 2021: No significant disease\par \par EXSE 2020: Augustine protocol 6 min - no EKG changes. No WMA. Accelerated HR. \par Resting echo: Normal LV function without significant valvular abnormalities. \par \par Holter 24 hour - SR with rare PVCs

## 2023-06-26 ENCOUNTER — NON-APPOINTMENT (OUTPATIENT)
Age: 48
End: 2023-06-26

## 2023-07-03 ENCOUNTER — NON-APPOINTMENT (OUTPATIENT)
Age: 48
End: 2023-07-03

## 2023-07-05 ENCOUNTER — APPOINTMENT (OUTPATIENT)
Dept: HEART AND VASCULAR | Facility: CLINIC | Age: 48
End: 2023-07-05
Payer: COMMERCIAL

## 2023-07-05 ENCOUNTER — NON-APPOINTMENT (OUTPATIENT)
Age: 48
End: 2023-07-05

## 2023-07-05 VITALS
BODY MASS INDEX: 41.68 KG/M2 | OXYGEN SATURATION: 93 % | DIASTOLIC BLOOD PRESSURE: 90 MMHG | TEMPERATURE: 97 F | HEIGHT: 68 IN | HEART RATE: 114 BPM | SYSTOLIC BLOOD PRESSURE: 136 MMHG | WEIGHT: 275 LBS | RESPIRATION RATE: 18 BRPM

## 2023-07-05 PROCEDURE — XXXXX: CPT | Mod: 1L

## 2023-07-18 ENCOUNTER — APPOINTMENT (OUTPATIENT)
Dept: HEART AND VASCULAR | Facility: CLINIC | Age: 48
End: 2023-07-18
Payer: COMMERCIAL

## 2023-07-18 VITALS
HEIGHT: 68 IN | HEART RATE: 112 BPM | OXYGEN SATURATION: 96 % | WEIGHT: 275 LBS | DIASTOLIC BLOOD PRESSURE: 72 MMHG | BODY MASS INDEX: 41.68 KG/M2 | SYSTOLIC BLOOD PRESSURE: 108 MMHG

## 2023-07-18 PROCEDURE — 93325 DOPPLER ECHO COLOR FLOW MAPG: CPT

## 2023-07-18 PROCEDURE — 93351 STRESS TTE COMPLETE: CPT

## 2023-07-18 PROCEDURE — 93320 DOPPLER ECHO COMPLETE: CPT

## 2023-08-10 ENCOUNTER — NON-APPOINTMENT (OUTPATIENT)
Age: 48
End: 2023-08-10

## 2023-08-23 ENCOUNTER — APPOINTMENT (OUTPATIENT)
Dept: PULMONOLOGY | Facility: CLINIC | Age: 48
End: 2023-08-23

## 2023-09-29 ENCOUNTER — APPOINTMENT (OUTPATIENT)
Dept: VASCULAR SURGERY | Facility: CLINIC | Age: 48
End: 2023-09-29
Payer: COMMERCIAL

## 2023-09-29 PROCEDURE — 99203 OFFICE O/P NEW LOW 30 MIN: CPT

## 2023-10-03 ENCOUNTER — APPOINTMENT (OUTPATIENT)
Dept: HEART AND VASCULAR | Facility: CLINIC | Age: 48
End: 2023-10-03
Payer: COMMERCIAL

## 2023-10-03 VITALS
HEIGHT: 69 IN | DIASTOLIC BLOOD PRESSURE: 70 MMHG | HEART RATE: 100 BPM | TEMPERATURE: 97.3 F | BODY MASS INDEX: 41.47 KG/M2 | OXYGEN SATURATION: 95 % | SYSTOLIC BLOOD PRESSURE: 125 MMHG | WEIGHT: 280 LBS | RESPIRATION RATE: 16 BRPM

## 2023-10-03 PROCEDURE — 99214 OFFICE O/P EST MOD 30 MIN: CPT

## 2023-10-03 RX ORDER — AMLODIPINE BESYLATE 5 MG/1
5 TABLET ORAL DAILY
Qty: 90 | Refills: 3 | Status: DISCONTINUED | COMMUNITY
Start: 2020-07-14 | End: 2023-10-03

## 2023-10-18 ENCOUNTER — APPOINTMENT (OUTPATIENT)
Dept: PULMONOLOGY | Facility: CLINIC | Age: 48
End: 2023-10-18

## 2023-11-15 ENCOUNTER — APPOINTMENT (OUTPATIENT)
Dept: PULMONOLOGY | Facility: CLINIC | Age: 48
End: 2023-11-15
Payer: COMMERCIAL

## 2023-11-15 VITALS
HEART RATE: 94 BPM | BODY MASS INDEX: 40.17 KG/M2 | WEIGHT: 272 LBS | DIASTOLIC BLOOD PRESSURE: 76 MMHG | SYSTOLIC BLOOD PRESSURE: 124 MMHG | OXYGEN SATURATION: 96 %

## 2023-11-15 DIAGNOSIS — E66.01 MORBID (SEVERE) OBESITY DUE TO EXCESS CALORIES: ICD-10-CM

## 2023-11-15 DIAGNOSIS — G47.33 OBSTRUCTIVE SLEEP APNEA (ADULT) (PEDIATRIC): ICD-10-CM

## 2023-11-15 PROCEDURE — 99213 OFFICE O/P EST LOW 20 MIN: CPT

## 2023-11-17 ENCOUNTER — APPOINTMENT (OUTPATIENT)
Dept: VASCULAR SURGERY | Facility: CLINIC | Age: 48
End: 2023-11-17
Payer: COMMERCIAL

## 2023-11-17 PROCEDURE — 99214 OFFICE O/P EST MOD 30 MIN: CPT

## 2023-12-19 ENCOUNTER — APPOINTMENT (OUTPATIENT)
Dept: HEART AND VASCULAR | Facility: CLINIC | Age: 48
End: 2023-12-19
Payer: COMMERCIAL

## 2023-12-19 PROCEDURE — 36415 COLL VENOUS BLD VENIPUNCTURE: CPT

## 2023-12-20 LAB
ALBUMIN SERPL ELPH-MCNC: 5 G/DL
ALP BLD-CCNC: 110 U/L
ALT SERPL-CCNC: 38 U/L
ANION GAP SERPL CALC-SCNC: 12 MMOL/L
AST SERPL-CCNC: 24 U/L
BILIRUB SERPL-MCNC: 0.4 MG/DL
BUN SERPL-MCNC: 20 MG/DL
CALCIUM SERPL-MCNC: 10 MG/DL
CHLORIDE SERPL-SCNC: 103 MMOL/L
CHOLEST SERPL-MCNC: 170 MG/DL
CO2 SERPL-SCNC: 25 MMOL/L
CREAT SERPL-MCNC: 1.03 MG/DL
EGFR: 90 ML/MIN/1.73M2
ESTIMATED AVERAGE GLUCOSE: 189 MG/DL
GLUCOSE SERPL-MCNC: 197 MG/DL
HBA1C MFR BLD HPLC: 8.2 %
HDLC SERPL-MCNC: 30 MG/DL
LDLC SERPL CALC-MCNC: 81 MG/DL
NONHDLC SERPL-MCNC: 140 MG/DL
POTASSIUM SERPL-SCNC: 5.3 MMOL/L
PROT SERPL-MCNC: 7.2 G/DL
SODIUM SERPL-SCNC: 140 MMOL/L
TRIGL SERPL-MCNC: 365 MG/DL

## 2023-12-22 ENCOUNTER — APPOINTMENT (OUTPATIENT)
Dept: HEART AND VASCULAR | Facility: CLINIC | Age: 48
End: 2023-12-22
Payer: COMMERCIAL

## 2023-12-22 PROCEDURE — 93000 ELECTROCARDIOGRAM COMPLETE: CPT

## 2023-12-22 PROCEDURE — 99215 OFFICE O/P EST HI 40 MIN: CPT | Mod: 25

## 2023-12-23 NOTE — PHYSICAL EXAM
[Normal] : no edema, no cyanosis, no clubbing, no varicosities [Moves all extremities] : moves all extremities [No Rash] : no rash [Alert and Oriented] : alert and oriented

## 2023-12-25 NOTE — HISTORY OF PRESENT ILLNESS
[FreeTextEntry1] : 48-year-old M with history of WPW, ablation history in , history of pericarditis in 2016, HTN, HL here for follow-up.   He remains on Rosuvastatin, Fenofibrate and Vascepa. He is no longer on Munjaro since getting COVID in 2023. Since last visit, it was found that he had triglycerides to >2000 for which his endocrinologist suggested getting an insulin drip. 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 He has also been working on his diet and exercise and has lost ~20 pounds.    Follow ups: - He recently saw Cardiologist, Dr. Streeter  - hepatologist for fibrosis vs hepatic steatosis - patient will get an MRI of the abdomen to confirm in the future - follows with endocrinology for DM  Labs and imaging: ---------------------------------------------------------------------------------------------------------------  - A1c (2023): 8.2 % - lipid profile (23): , , HDL 30, LDL 81 - CMP (2023): AST 24, ALT 38 - A1C (2023): 7.0% - Lipid profile (2023): T, TC: 222, HDL: 35, LDL: unable to calculate  - A1c: 7% - LP(a) 19.6  Lipids- - Chol 15 Sep 2021: , HDL 31, Non . A1c - 7.5% - chol-221, HDL 31, , HDL- 31, non-HDL- 191 - HbA1C 7.2 - DLA 2021: No significant LE arterial disease.  - Carotid 2021: No significant disease  - US abdomen (2023): cirrhosis noted  - GI US Fibroscan (2023): fibrosis score of 0 and severe hepatic steatosis noted  - US carotid arteries (10/2023): normal  - Exercise stress echo (2023): negative  - Echo (2023): technically difficult image, nl LV cavity size, nl LV cavity size, LV wall thickness is mildly increased, no regional wall motion abnormalities, mildly enlarged RV caivty size, nl RV thickness and nl RV systolic funciton, tricuspid annular plane systolic excursion (TAPSE) is 2.1 cm (normal >=1.7 cm)., no pericardial effusion, EF 69% - EXSE 2020: Augustine protocol 6 min - no EKG changes. No WMA. Accelerated HR.  - Resting echo: Normal LV function without significant valvular abnormalities.  - Holter 24 hour - SR with rare PVCs

## 2023-12-25 NOTE — END OF VISIT
[FreeTextEntry3] : Patient seen and examined. Case discussed with preventive cardiology fellow. Agree with plan as detailed above.  [] : Fellow [Time Spent: ___ minutes] : I have spent [unfilled] minutes of time on the encounter.

## 2023-12-25 NOTE — DISCUSSION/SUMMARY
[FreeTextEntry1] : 48-year-old M with history of WPW, ablation history in 2000, history of pericarditis in 2016, HTN, HL here for follow-up.   #Primary prevention #ASCVD risk optimization  #Obesity, DM2; most recent A1c >8 #Mixed dyslipidemia- markedly improved with weight loss - Recent lipids reveal severe TAG at 365 - likely polygenic disorder - Continue with Crestor 40 mg, fenofibrate 200 mg and Vascepa 2 G BID - advised patient to work with the endocrinologist to restart the munjaro and wean off insulin  - Continue Jardiance 10 mg QD and Metformin 500 mg BID - most recent LDL-C 81, hopefully will improve with diet and weight loss, given DM2 goal <70 - Encouraged patient to continue healthy exercise and eating habits, focusing on a Mediterranean style of eating w/ more plant based foods in general, and aiming for the recommended 150 minutes per week of moderate physical activity  HTN - BP well controlled - Off amlodipine, currently only on metoprolol ER 5 mg oral daily   LE swelling: - RLE US (02/2022) revealed no DVT - For now, continue diuresis with Lasix 40 mg as needed - Recommended conservative measures to improve edema such as compression socks, elevation of legs while sitting and decreased salt intake - patient is also following with vascular doctor   #Liver fibrosis vs hepatic steatosis - patient is following with a hepatologist  - pending MRI for confirmation of diagnosis    Pt.to RTC in 4-6 months for follow-up [EKG obtained to assist in diagnosis and management of assessed problem(s)] : EKG obtained to assist in diagnosis and management of assessed problem(s)

## 2023-12-25 NOTE — REVIEW OF SYSTEMS
[Feeling Fatigued] : feeling fatigued [Lower Ext Edema] : lower extremity edema [Fever] : no fever [Chills] : no chills [SOB] : no shortness of breath [Dyspnea on exertion] : not dyspnea during exertion [Chest Discomfort] : no chest discomfort [Leg Claudication] : no intermittent leg claudication [Palpitations] : no palpitations [Orthopnea] : no orthopnea [PND] : no PND [Syncope] : no syncope [Cough] : no cough [Abdominal Pain] : no abdominal pain [Nausea] : no nausea [Change in Appetite] : no change in appetite [Change In The Stool] : no change in stool [Constipation] : no constipation [Blood in stool] : no blood in stoo [Joint Pain] : no joint pain [Joint Swelling] : no joint swelling [Myalgia] : no myalgia [Dizziness] : no dizziness [Easy Bleeding] : no tendency for easy bleeding [Swollen Glands] : no swollen glands

## 2024-01-01 ENCOUNTER — RX RENEWAL (OUTPATIENT)
Age: 49
End: 2024-01-01

## 2024-01-01 RX ORDER — TIRZEPATIDE 2.5 MG/.5ML
2.5 INJECTION, SOLUTION SUBCUTANEOUS
Qty: 12 | Refills: 3 | Status: DISCONTINUED | COMMUNITY
Start: 2023-06-23 | End: 2024-01-01

## 2024-01-01 RX ORDER — ICOSAPENT ETHYL 1 G/1
1 CAPSULE ORAL TWICE DAILY
Qty: 360 | Refills: 3 | Status: ACTIVE | COMMUNITY
Start: 2021-09-21 | End: 1900-01-01

## 2024-02-22 ENCOUNTER — APPOINTMENT (OUTPATIENT)
Dept: HEART AND VASCULAR | Facility: CLINIC | Age: 49
End: 2024-02-22
Payer: COMMERCIAL

## 2024-02-22 DIAGNOSIS — E11.69 TYPE 2 DIABETES MELLITUS WITH OTHER SPECIFIED COMPLICATION: ICD-10-CM

## 2024-02-22 DIAGNOSIS — E66.9 TYPE 2 DIABETES MELLITUS WITH OTHER SPECIFIED COMPLICATION: ICD-10-CM

## 2024-02-22 PROCEDURE — 99213 OFFICE O/P EST LOW 20 MIN: CPT

## 2024-02-22 NOTE — HISTORY OF PRESENT ILLNESS
[FreeTextEntry1] : 48-year-old M with history of WPW, ablation history in 2000, history of pericarditis in 2016, HTN, HL here for follow-up.   He remains on Rosuvastatin, Fenofibrate and Vascepa. He recently restarted Mounjaro; on starting dose of 2.5mg currently and has f/u w/ endo soon. He was previously found to have triglycerides to >2000 for which his endocrinologist suggested getting an insulin drip. 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 He has also been working on his diet and exercise. He feels that since restarting the Mounjaro that he is eating smaller portions, but doesn't feel he has lost weight yet. He reports occasional mild nausea, but otherwise not ADEs.  He and his brother both stay up late - often 1am or later - and he notes wanting to eat late at night. He tries to make sure not to eat w/in 2 hours of bedtime, however.    Follow ups: - He recently saw Cardiologist, Dr. Streeter  - hepatologist for fibrosis vs hepatic steatosis - patient will get an MRI of the abdomen to confirm in the future - follows with endocrinology for DM  Labs and imaging: ---------------------------------------------------------------------------------------------------------------  - A1c (2023): 8.2 % - lipid profile (23): , , HDL 30, LDL 81 - CMP (2023): AST 24, ALT 38 - A1C (2023): 7.0% - Lipid profile (2023): T, TC: 222, HDL: 35, LDL: unable to calculate  - A1c: 7% - LP(a) 19.6  Lipids- - Chol 15 Sep 2021: , HDL 31, Non . A1c - 7.5% - chol-221, HDL 31, , HDL- 31, non-HDL- 191 - HbA1C 7.2 - DLA 2021: No significant LE arterial disease.  - Carotid 2021: No significant disease  - US abdomen (2023): cirrhosis noted  - GI US Fibroscan (2023): fibrosis score of 0 and severe hepatic steatosis noted  - US carotid arteries (10/2023): normal  - Exercise stress echo (2023): negative  - Echo (2023): technically difficult image, nl LV cavity size, nl LV cavity size, LV wall thickness is mildly increased, no regional wall motion abnormalities, mildly enlarged RV caivty size, nl RV thickness and nl RV systolic funciton, tricuspid annular plane systolic excursion (TAPSE) is 2.1 cm (normal >=1.7 cm)., no pericardial effusion, EF 69% - EXSE 2020: Augustine protocol 6 min - no EKG changes. No WMA. Accelerated HR.  - Resting echo: Normal LV function without significant valvular abnormalities.  - Holter 24 hour - SR with rare PVCs
ambulatory

## 2024-02-22 NOTE — DISCUSSION/SUMMARY
[FreeTextEntry1] : 48-year-old M with history of WPW, ablation history in 2000, history of pericarditis in 2016, HTN, HL here for follow-up.   #Primary prevention #ASCVD risk optimization  #Obesity, DM2; most recent A1c >8 #Mixed dyslipidemia- improved with weight loss - Recent lipids reveal severe TAG at 365 - likely polygenic disorder - Continue with Crestor 40 mg, fenofibrate 200 mg and Vascepa 2 G BID - advised patient to continue to work with the endocrinologist to uptitrate the Mounjaro and wean off insulin; currently on 2.5mg weekly dosage and has f/u w/ endo soon  - Continue Jardiance 10 mg QD and Metformin 500 mg BID - most recent LDL-C 81, hopefully will improve with diet and weight loss, given DM2 goal <70 - Encouraged patient to continue healthy exercise and eating habits, focusing on a Mediterranean style of eating w/ more plant based foods in general, and aiming for the recommended 150 minutes per week of moderate physical activity  HTN - BP well controlled - Off amlodipine, currently only on metoprolol ER 5 mg oral daily   LE swelling: - RLE US (02/2022) revealed no DVT - For now, continue diuresis with Lasix 40 mg as needed - Recommended conservative measures to improve edema such as compression socks, elevation of legs while sitting and decreased salt intake - patient is also following with vascular doctor   #Liver fibrosis vs hepatic steatosis - patient is following with a hepatologist  - pending MRI for confirmation of diagnosis    Pt has f/u w brother in June.

## 2024-02-22 NOTE — REASON FOR VISIT
[Home] : at home, [unfilled] , at the time of the visit. [Other Location: e.g. Home (Enter Location, City,State)___] : at [unfilled] [Patient] : the patient [Self] : self [Hyperlipidemia] : hyperlipidemia [Hypertension] : hypertension 109

## 2024-03-18 ENCOUNTER — APPOINTMENT (OUTPATIENT)
Dept: GASTROENTEROLOGY | Facility: CLINIC | Age: 49
End: 2024-03-18
Payer: COMMERCIAL

## 2024-03-18 VITALS
OXYGEN SATURATION: 98 % | BODY MASS INDEX: 42.06 KG/M2 | WEIGHT: 268 LBS | DIASTOLIC BLOOD PRESSURE: 78 MMHG | HEIGHT: 67 IN | SYSTOLIC BLOOD PRESSURE: 153 MMHG | HEART RATE: 97 BPM

## 2024-03-18 DIAGNOSIS — K21.9 GASTRO-ESOPHAGEAL REFLUX DISEASE W/OUT ESOPHAGITIS: ICD-10-CM

## 2024-03-18 DIAGNOSIS — K76.0 FATTY (CHANGE OF) LIVER, NOT ELSEWHERE CLASSIFIED: ICD-10-CM

## 2024-03-18 PROCEDURE — G2211 COMPLEX E/M VISIT ADD ON: CPT | Mod: NC,1L

## 2024-03-18 PROCEDURE — 99214 OFFICE O/P EST MOD 30 MIN: CPT

## 2024-03-18 NOTE — ASSESSMENT
[FreeTextEntry1] : Due for a repeat colonoscopy in 2026.  Will await results of his upcoming MRI in his workup for cirrhosis, given that his prior findings have been inconclusive. If suggestive/diagnostic for cirrhosis, would then plan on an EGD for variceal screening.

## 2024-03-18 NOTE — HISTORY OF PRESENT ILLNESS
[FreeTextEntry1] : Mr. Lopes is a pleasant 48M h/o WPW s/p ablation 2000, paricarditis 2016, HTN, HLD, metabolic syndrome who returns for f/u. Feels well, no complaints.  Previously elevated liver enzymes, however returned to normal. Recently had an abd U/S ordered by his PCP for unclear reasons (elevated liver enzymes??), report reviewed from 9/28/23 showing findings c/w hepatic steatosis, hepatic cirrhosis with evidence of portal HTN. He has since seen a hepatologist at [facility], had a fibroscan which he states was "normal," has an MRI pending given his inconclusive results as of yet.  He underwent an EGD/colonoscopy with myself 11/21 showing H. pylori on gastric biopsies (treated with amoxicillin/clarithromycin triple therapy), and two sub-cm colonic tubular adenomas. Had a confirmatory H. pylori stool Ag 3/22 showing eradication of his infection. Due for a repeat colonoscopy in 2026.  GERD has been well controlled, was able to come off of his PPI medications through weight loss. Does not smoke or drink. No FHx of any GI malignancies.

## 2024-06-24 ENCOUNTER — APPOINTMENT (OUTPATIENT)
Dept: HEART AND VASCULAR | Facility: CLINIC | Age: 49
End: 2024-06-24
Payer: COMMERCIAL

## 2024-06-24 DIAGNOSIS — E78.1 PURE HYPERGLYCERIDEMIA: ICD-10-CM

## 2024-06-24 DIAGNOSIS — E88.810 METABOLIC SYNDROME: ICD-10-CM

## 2024-06-24 DIAGNOSIS — E78.5 HYPERLIPIDEMIA, UNSPECIFIED: ICD-10-CM

## 2024-06-24 DIAGNOSIS — I10 ESSENTIAL (PRIMARY) HYPERTENSION: ICD-10-CM

## 2024-06-24 PROCEDURE — 99215 OFFICE O/P EST HI 40 MIN: CPT

## 2024-06-24 PROCEDURE — G2211 COMPLEX E/M VISIT ADD ON: CPT | Mod: NC

## 2024-07-01 ENCOUNTER — APPOINTMENT (OUTPATIENT)
Dept: HEART AND VASCULAR | Facility: CLINIC | Age: 49
End: 2024-07-01
Payer: COMMERCIAL

## 2024-07-01 VITALS
BODY MASS INDEX: 40.65 KG/M2 | RESPIRATION RATE: 18 BRPM | WEIGHT: 259 LBS | DIASTOLIC BLOOD PRESSURE: 74 MMHG | HEIGHT: 67 IN | SYSTOLIC BLOOD PRESSURE: 106 MMHG | HEART RATE: 100 BPM | OXYGEN SATURATION: 95 %

## 2024-07-01 PROCEDURE — 99204 OFFICE O/P NEW MOD 45 MIN: CPT

## 2024-07-10 ENCOUNTER — APPOINTMENT (OUTPATIENT)
Dept: HEART AND VASCULAR | Facility: CLINIC | Age: 49
End: 2024-07-10

## 2024-07-10 PROCEDURE — 99212 OFFICE O/P EST SF 10 MIN: CPT

## 2024-07-16 RX ORDER — TIRZEPATIDE 7.5 MG/.5ML
7.5 INJECTION, SOLUTION SUBCUTANEOUS
Qty: 6 | Refills: 3 | Status: ACTIVE | COMMUNITY
Start: 2024-07-16 | End: 1900-01-01

## 2024-07-17 RX ORDER — METOPROLOL SUCCINATE 100 MG/1
100 TABLET, EXTENDED RELEASE ORAL
Qty: 4 | Refills: 0 | Status: ACTIVE | COMMUNITY
Start: 2024-07-17 | End: 1900-01-01

## 2024-07-24 ENCOUNTER — NON-APPOINTMENT (OUTPATIENT)
Age: 49
End: 2024-07-24

## 2024-07-25 ENCOUNTER — APPOINTMENT (OUTPATIENT)
Dept: HEART AND VASCULAR | Facility: CLINIC | Age: 49
End: 2024-07-25

## 2024-07-25 RX ORDER — ICOSAPENT ETHYL 1000 MG/1
1 CAPSULE ORAL TWICE DAILY
Qty: 360 | Refills: 3 | Status: ACTIVE | COMMUNITY
Start: 2024-07-25 | End: 1900-01-01

## 2024-07-25 RX ORDER — METOPROLOL SUCCINATE 50 MG/1
50 TABLET, EXTENDED RELEASE ORAL
Qty: 90 | Refills: 0 | Status: ACTIVE | COMMUNITY
Start: 2024-07-25

## 2024-07-29 ENCOUNTER — NON-APPOINTMENT (OUTPATIENT)
Age: 49
End: 2024-07-29

## 2024-08-06 ENCOUNTER — APPOINTMENT (OUTPATIENT)
Dept: HEART AND VASCULAR | Facility: CLINIC | Age: 49
End: 2024-08-06

## 2024-08-06 PROCEDURE — 36415 COLL VENOUS BLD VENIPUNCTURE: CPT

## 2024-08-12 ENCOUNTER — NON-APPOINTMENT (OUTPATIENT)
Age: 49
End: 2024-08-12

## 2024-09-04 ENCOUNTER — APPOINTMENT (OUTPATIENT)
Dept: PULMONOLOGY | Facility: CLINIC | Age: 49
End: 2024-09-04

## 2024-11-06 ENCOUNTER — APPOINTMENT (OUTPATIENT)
Dept: HEART AND VASCULAR | Facility: CLINIC | Age: 49
End: 2024-11-06
Payer: COMMERCIAL

## 2024-11-06 PROCEDURE — 36415 COLL VENOUS BLD VENIPUNCTURE: CPT

## 2024-11-07 RX ORDER — TIRZEPATIDE 12.5 MG/.5ML
12.5 INJECTION, SOLUTION SUBCUTANEOUS
Qty: 1 | Refills: 3 | Status: ACTIVE | COMMUNITY
Start: 2024-11-07 | End: 1900-01-01

## 2024-11-10 LAB
ALBUMIN SERPL ELPH-MCNC: 4.9 G/DL
ALP BLD-CCNC: 71 U/L
ALT SERPL-CCNC: 44 U/L
ANION GAP SERPL CALC-SCNC: 12 MMOL/L
AST SERPL-CCNC: 29 U/L
BILIRUB SERPL-MCNC: 0.5 MG/DL
BUN SERPL-MCNC: 17 MG/DL
CALCIUM SERPL-MCNC: 9.7 MG/DL
CHLORIDE SERPL-SCNC: 102 MMOL/L
CO2 SERPL-SCNC: 26 MMOL/L
CREAT SERPL-MCNC: 0.84 MG/DL
EGFR: 108 ML/MIN/1.73M2
GLUCOSE SERPL-MCNC: 138 MG/DL
POTASSIUM SERPL-SCNC: 4.5 MMOL/L
PROT SERPL-MCNC: 7.4 G/DL
SODIUM SERPL-SCNC: 140 MMOL/L

## 2024-11-13 ENCOUNTER — RESULT REVIEW (OUTPATIENT)
Age: 49
End: 2024-11-13

## 2024-11-14 ENCOUNTER — NON-APPOINTMENT (OUTPATIENT)
Age: 49
End: 2024-11-14

## 2024-11-18 ENCOUNTER — NON-APPOINTMENT (OUTPATIENT)
Age: 49
End: 2024-11-18

## 2024-11-18 ENCOUNTER — APPOINTMENT (OUTPATIENT)
Dept: HEART AND VASCULAR | Facility: CLINIC | Age: 49
End: 2024-11-18

## 2024-11-18 VITALS
RESPIRATION RATE: 16 BRPM | WEIGHT: 250 LBS | OXYGEN SATURATION: 97 % | BODY MASS INDEX: 39.24 KG/M2 | HEART RATE: 85 BPM | DIASTOLIC BLOOD PRESSURE: 76 MMHG | HEIGHT: 67 IN | SYSTOLIC BLOOD PRESSURE: 116 MMHG

## 2024-11-18 PROCEDURE — 99214 OFFICE O/P EST MOD 30 MIN: CPT | Mod: 25

## 2024-11-18 PROCEDURE — 93000 ELECTROCARDIOGRAM COMPLETE: CPT

## 2024-12-17 ENCOUNTER — APPOINTMENT (OUTPATIENT)
Dept: HEART AND VASCULAR | Facility: CLINIC | Age: 49
End: 2024-12-17
Payer: COMMERCIAL

## 2024-12-17 PROCEDURE — 36415 COLL VENOUS BLD VENIPUNCTURE: CPT

## 2024-12-18 LAB
BASOPHILS # BLD AUTO: 0.05 K/UL
BASOPHILS NFR BLD AUTO: 0.7 %
EOSINOPHIL # BLD AUTO: 0.16 K/UL
EOSINOPHIL NFR BLD AUTO: 2.2 %
HCT VFR BLD CALC: 52.3 %
HGB BLD-MCNC: 17 G/DL
IMM GRANULOCYTES NFR BLD AUTO: 0.7 %
LYMPHOCYTES # BLD AUTO: 2.62 K/UL
LYMPHOCYTES NFR BLD AUTO: 35.2 %
MAN DIFF?: NORMAL
MCHC RBC-ENTMCNC: 29.9 PG
MCHC RBC-ENTMCNC: 32.5 G/DL
MCV RBC AUTO: 92.1 FL
MONOCYTES # BLD AUTO: 0.58 K/UL
MONOCYTES NFR BLD AUTO: 7.8 %
NEUTROPHILS # BLD AUTO: 3.98 K/UL
NEUTROPHILS NFR BLD AUTO: 53.4 %
PLATELET # BLD AUTO: 200 K/UL
RBC # BLD: 5.68 M/UL
RBC # FLD: 13.2 %
WBC # FLD AUTO: 7.44 K/UL

## 2024-12-19 LAB
ALBUMIN SERPL ELPH-MCNC: 5.1 G/DL
ALP BLD-CCNC: 95 U/L
ALT SERPL-CCNC: 49 U/L
ANION GAP SERPL CALC-SCNC: 16 MMOL/L
AST SERPL-CCNC: 29 U/L
BILIRUB SERPL-MCNC: 0.4 MG/DL
BUN SERPL-MCNC: 19 MG/DL
CALCIUM SERPL-MCNC: 10.2 MG/DL
CHLORIDE SERPL-SCNC: 102 MMOL/L
CHOLEST SERPL-MCNC: 208 MG/DL
CO2 SERPL-SCNC: 26 MMOL/L
CREAT SERPL-MCNC: 0.89 MG/DL
EGFR: 106 ML/MIN/1.73M2
ESTIMATED AVERAGE GLUCOSE: 128 MG/DL
GLUCOSE SERPL-MCNC: 161 MG/DL
HBA1C MFR BLD HPLC: 6.1 %
HDLC SERPL-MCNC: 34 MG/DL
LDLC SERPL CALC-MCNC: 113 MG/DL
NONHDLC SERPL-MCNC: 174 MG/DL
POTASSIUM SERPL-SCNC: 4.6 MMOL/L
PROT SERPL-MCNC: 7.3 G/DL
SODIUM SERPL-SCNC: 145 MMOL/L
TRIGL SERPL-MCNC: 348 MG/DL

## 2024-12-23 ENCOUNTER — APPOINTMENT (OUTPATIENT)
Dept: HEART AND VASCULAR | Facility: CLINIC | Age: 49
End: 2024-12-23
Payer: COMMERCIAL

## 2024-12-23 DIAGNOSIS — I25.10 ATHEROSCLEROTIC HEART DISEASE OF NATIVE CORONARY ARTERY W/OUT ANGINA PECTORIS: ICD-10-CM

## 2024-12-23 PROCEDURE — 99215 OFFICE O/P EST HI 40 MIN: CPT

## 2024-12-23 PROCEDURE — G2211 COMPLEX E/M VISIT ADD ON: CPT | Mod: NC

## 2024-12-24 RX ORDER — EZETIMIBE 10 MG/1
10 TABLET ORAL
Qty: 90 | Refills: 3 | Status: ACTIVE | COMMUNITY
Start: 2024-12-24 | End: 1900-01-01

## 2024-12-24 RX ORDER — EVOLOCUMAB 140 MG/ML
140 INJECTION, SOLUTION SUBCUTANEOUS
Qty: 3 | Refills: 3 | Status: DISCONTINUED | COMMUNITY
Start: 2024-12-24 | End: 2024-12-24

## 2024-12-24 RX ORDER — ALIROCUMAB 150 MG/ML
150 INJECTION, SOLUTION SUBCUTANEOUS
Qty: 3 | Refills: 3 | Status: DISCONTINUED | COMMUNITY
Start: 2024-12-23 | End: 2024-12-24

## 2025-03-18 ENCOUNTER — APPOINTMENT (OUTPATIENT)
Dept: GASTROENTEROLOGY | Facility: CLINIC | Age: 50
End: 2025-03-18
Payer: COMMERCIAL

## 2025-03-18 VITALS
HEIGHT: 67 IN | OXYGEN SATURATION: 96 % | DIASTOLIC BLOOD PRESSURE: 80 MMHG | BODY MASS INDEX: 39.39 KG/M2 | HEART RATE: 105 BPM | WEIGHT: 251 LBS | SYSTOLIC BLOOD PRESSURE: 132 MMHG

## 2025-03-18 DIAGNOSIS — Z12.11 ENCOUNTER FOR SCREENING FOR MALIGNANT NEOPLASM OF COLON: ICD-10-CM

## 2025-03-18 DIAGNOSIS — K21.9 GASTRO-ESOPHAGEAL REFLUX DISEASE W/OUT ESOPHAGITIS: ICD-10-CM

## 2025-03-18 DIAGNOSIS — K76.0 FATTY (CHANGE OF) LIVER, NOT ELSEWHERE CLASSIFIED: ICD-10-CM

## 2025-03-18 PROCEDURE — 99214 OFFICE O/P EST MOD 30 MIN: CPT

## 2025-03-18 PROCEDURE — G2211 COMPLEX E/M VISIT ADD ON: CPT | Mod: NC

## 2025-03-26 ENCOUNTER — APPOINTMENT (OUTPATIENT)
Dept: HEART AND VASCULAR | Facility: CLINIC | Age: 50
End: 2025-03-26
Payer: COMMERCIAL

## 2025-03-26 VITALS
WEIGHT: 256 LBS | BODY MASS INDEX: 40.18 KG/M2 | OXYGEN SATURATION: 97 % | DIASTOLIC BLOOD PRESSURE: 76 MMHG | HEART RATE: 103 BPM | HEIGHT: 67 IN | RESPIRATION RATE: 17 BRPM | SYSTOLIC BLOOD PRESSURE: 124 MMHG

## 2025-03-26 PROCEDURE — 99214 OFFICE O/P EST MOD 30 MIN: CPT

## 2025-04-02 ENCOUNTER — APPOINTMENT (OUTPATIENT)
Dept: HEART AND VASCULAR | Facility: CLINIC | Age: 50
End: 2025-04-02
Payer: COMMERCIAL

## 2025-04-02 PROCEDURE — 36415 COLL VENOUS BLD VENIPUNCTURE: CPT

## 2025-04-04 ENCOUNTER — NON-APPOINTMENT (OUTPATIENT)
Age: 50
End: 2025-04-04

## 2025-04-04 LAB
ALBUMIN SERPL ELPH-MCNC: 4.7 G/DL
ALP BLD-CCNC: 133 U/L
ALT SERPL-CCNC: 55 U/L
ANION GAP SERPL CALC-SCNC: 11 MMOL/L
AST SERPL-CCNC: 31 U/L
BASOPHILS # BLD AUTO: 0.05 K/UL
BASOPHILS NFR BLD AUTO: 0.6 %
BILIRUB SERPL-MCNC: 0.6 MG/DL
BUN SERPL-MCNC: 14 MG/DL
CALCIUM SERPL-MCNC: 9.6 MG/DL
CHLORIDE SERPL-SCNC: 103 MMOL/L
CHOLEST SERPL-MCNC: 194 MG/DL
CO2 SERPL-SCNC: 29 MMOL/L
CREAT SERPL-MCNC: 0.79 MG/DL
EGFRCR SERPLBLD CKD-EPI 2021: 110 ML/MIN/1.73M2
EOSINOPHIL # BLD AUTO: 0.23 K/UL
EOSINOPHIL NFR BLD AUTO: 2.8 %
ESTIMATED AVERAGE GLUCOSE: 171 MG/DL
GLUCOSE SERPL-MCNC: 213 MG/DL
HBA1C MFR BLD HPLC: 7.6 %
HCT VFR BLD CALC: 47.5 %
HDLC SERPL-MCNC: 32 MG/DL
HGB BLD-MCNC: 16 G/DL
IMM GRANULOCYTES NFR BLD AUTO: 1 %
LDLC SERPL-MCNC: 102 MG/DL
LYMPHOCYTES # BLD AUTO: 2.61 K/UL
LYMPHOCYTES NFR BLD AUTO: 31.4 %
MAN DIFF?: NORMAL
MCHC RBC-ENTMCNC: 29.9 PG
MCHC RBC-ENTMCNC: 33.7 G/DL
MCV RBC AUTO: 88.8 FL
MONOCYTES # BLD AUTO: 0.77 K/UL
MONOCYTES NFR BLD AUTO: 9.3 %
NEUTROPHILS # BLD AUTO: 4.58 K/UL
NEUTROPHILS NFR BLD AUTO: 54.9 %
NONHDLC SERPL-MCNC: 162 MG/DL
PLATELET # BLD AUTO: 167 K/UL
POTASSIUM SERPL-SCNC: 5.2 MMOL/L
PROT SERPL-MCNC: 7.1 G/DL
RBC # BLD: 5.35 M/UL
RBC # FLD: 14.3 %
SODIUM SERPL-SCNC: 143 MMOL/L
TRIGL SERPL-MCNC: 356 MG/DL
WBC # FLD AUTO: 8.32 K/UL

## 2025-04-08 RX ORDER — EVOLOCUMAB 140 MG/ML
140 INJECTION, SOLUTION SUBCUTANEOUS
Qty: 6 | Refills: 3 | Status: ACTIVE | COMMUNITY
Start: 2025-04-08 | End: 1900-01-01

## 2025-06-05 ENCOUNTER — APPOINTMENT (OUTPATIENT)
Dept: HEART AND VASCULAR | Facility: CLINIC | Age: 50
End: 2025-06-05
Payer: COMMERCIAL

## 2025-06-05 PROCEDURE — 36415 COLL VENOUS BLD VENIPUNCTURE: CPT

## 2025-06-09 LAB
ALBUMIN SERPL ELPH-MCNC: 4.5 G/DL
ALP BLD-CCNC: 137 U/L
ALT SERPL-CCNC: 75 U/L
ANION GAP SERPL CALC-SCNC: 14 MMOL/L
AST SERPL-CCNC: 37 U/L
BASOPHILS # BLD AUTO: 0.05 K/UL
BASOPHILS NFR BLD AUTO: 0.7 %
BILIRUB SERPL-MCNC: 0.5 MG/DL
BUN SERPL-MCNC: 18 MG/DL
CALCIUM SERPL-MCNC: 9 MG/DL
CHLORIDE SERPL-SCNC: 101 MMOL/L
CHOLEST SERPL-MCNC: 162 MG/DL
CO2 SERPL-SCNC: 22 MMOL/L
CREAT SERPL-MCNC: 0.78 MG/DL
EGFRCR SERPLBLD CKD-EPI 2021: 110 ML/MIN/1.73M2
EOSINOPHIL # BLD AUTO: 0.22 K/UL
EOSINOPHIL NFR BLD AUTO: 3.1 %
ESTIMATED AVERAGE GLUCOSE: 232 MG/DL
GLUCOSE SERPL-MCNC: 335 MG/DL
HBA1C MFR BLD HPLC: 9.7 %
HCT VFR BLD CALC: 46.5 %
HDLC SERPL-MCNC: 23 MG/DL
HGB BLD-MCNC: 15.3 G/DL
IMM GRANULOCYTES NFR BLD AUTO: 0.4 %
LDLC SERPL-MCNC: 50 MG/DL
LYMPHOCYTES # BLD AUTO: 2.42 K/UL
LYMPHOCYTES NFR BLD AUTO: 34.6 %
MAN DIFF?: NORMAL
MCHC RBC-ENTMCNC: 29.4 PG
MCHC RBC-ENTMCNC: 32.9 G/DL
MCV RBC AUTO: 89.3 FL
MONOCYTES # BLD AUTO: 0.63 K/UL
MONOCYTES NFR BLD AUTO: 9 %
NEUTROPHILS # BLD AUTO: 3.64 K/UL
NEUTROPHILS NFR BLD AUTO: 52.2 %
NONHDLC SERPL-MCNC: 139 MG/DL
PLATELET # BLD AUTO: 168 K/UL
POTASSIUM SERPL-SCNC: 4.2 MMOL/L
PROT SERPL-MCNC: 6.6 G/DL
RBC # BLD: 5.21 M/UL
RBC # FLD: 13.7 %
SODIUM SERPL-SCNC: 137 MMOL/L
TRIGL SERPL-MCNC: 610 MG/DL
WBC # FLD AUTO: 6.99 K/UL

## 2025-06-12 ENCOUNTER — NON-APPOINTMENT (OUTPATIENT)
Age: 50
End: 2025-06-12

## 2025-06-12 ENCOUNTER — APPOINTMENT (OUTPATIENT)
Dept: HEART AND VASCULAR | Facility: CLINIC | Age: 50
End: 2025-06-12

## 2025-06-12 VITALS
SYSTOLIC BLOOD PRESSURE: 133 MMHG | HEART RATE: 109 BPM | DIASTOLIC BLOOD PRESSURE: 83 MMHG | HEIGHT: 67 IN | BODY MASS INDEX: 39.87 KG/M2 | TEMPERATURE: 97.2 F | WEIGHT: 254 LBS | OXYGEN SATURATION: 95 %

## 2025-06-12 PROCEDURE — G2211 COMPLEX E/M VISIT ADD ON: CPT | Mod: NC

## 2025-06-12 PROCEDURE — 93000 ELECTROCARDIOGRAM COMPLETE: CPT

## 2025-06-12 PROCEDURE — 99215 OFFICE O/P EST HI 40 MIN: CPT

## 2025-06-12 RX ORDER — TIRZEPATIDE 2.5 MG/.5ML
2.5 INJECTION, SOLUTION SUBCUTANEOUS
Qty: 1 | Refills: 5 | Status: ACTIVE | COMMUNITY
Start: 2025-06-12 | End: 1900-01-01

## 2025-06-12 RX ORDER — EZETIMIBE 10 MG/1
10 TABLET ORAL DAILY
Qty: 90 | Refills: 3 | Status: ACTIVE | COMMUNITY
Start: 2025-06-12 | End: 1900-01-01

## 2025-06-13 LAB — NT-PROBNP SERPL-MCNC: <36 PG/ML
